# Patient Record
Sex: MALE | Race: WHITE | NOT HISPANIC OR LATINO
[De-identification: names, ages, dates, MRNs, and addresses within clinical notes are randomized per-mention and may not be internally consistent; named-entity substitution may affect disease eponyms.]

---

## 2018-04-26 ENCOUNTER — RESULT REVIEW (OUTPATIENT)
Age: 83
End: 2018-04-26

## 2018-07-10 PROBLEM — Z00.00 ENCOUNTER FOR PREVENTIVE HEALTH EXAMINATION: Status: ACTIVE | Noted: 2018-07-10

## 2018-07-26 ENCOUNTER — APPOINTMENT (OUTPATIENT)
Dept: HEMATOLOGY ONCOLOGY | Facility: CLINIC | Age: 83
End: 2018-07-26
Payer: MEDICARE

## 2018-07-26 VITALS
HEART RATE: 69 BPM | BODY MASS INDEX: 17.02 KG/M2 | SYSTOLIC BLOOD PRESSURE: 117 MMHG | WEIGHT: 111 LBS | TEMPERATURE: 99.3 F | DIASTOLIC BLOOD PRESSURE: 65 MMHG | HEIGHT: 67.52 IN | RESPIRATION RATE: 16 BRPM | OXYGEN SATURATION: 97 %

## 2018-07-26 DIAGNOSIS — Z86.39 PERSONAL HISTORY OF OTHER ENDOCRINE, NUTRITIONAL AND METABOLIC DISEASE: ICD-10-CM

## 2018-07-26 DIAGNOSIS — Z80.0 FAMILY HISTORY OF MALIGNANT NEOPLASM OF DIGESTIVE ORGANS: ICD-10-CM

## 2018-07-26 DIAGNOSIS — Z85.828 PERSONAL HISTORY OF OTHER MALIGNANT NEOPLASM OF SKIN: ICD-10-CM

## 2018-07-26 DIAGNOSIS — Z82.49 FAMILY HISTORY OF ISCHEMIC HEART DISEASE AND OTHER DISEASES OF THE CIRCULATORY SYSTEM: ICD-10-CM

## 2018-07-26 PROCEDURE — 99214 OFFICE O/P EST MOD 30 MIN: CPT

## 2018-08-02 ENCOUNTER — APPOINTMENT (OUTPATIENT)
Dept: CARDIOLOGY | Facility: CLINIC | Age: 83
End: 2018-08-02

## 2018-08-02 VITALS
OXYGEN SATURATION: 99 % | WEIGHT: 112 LBS | TEMPERATURE: 97.9 F | HEART RATE: 132 BPM | SYSTOLIC BLOOD PRESSURE: 96 MMHG | HEIGHT: 68 IN | DIASTOLIC BLOOD PRESSURE: 64 MMHG | BODY MASS INDEX: 16.97 KG/M2

## 2018-08-02 DIAGNOSIS — M19.90 UNSPECIFIED OSTEOARTHRITIS, UNSPECIFIED SITE: ICD-10-CM

## 2018-08-02 DIAGNOSIS — Z78.9 OTHER SPECIFIED HEALTH STATUS: ICD-10-CM

## 2018-08-02 DIAGNOSIS — N40.0 BENIGN PROSTATIC HYPERPLASIA WITHOUT LOWER URINARY TRACT SYMPMS: ICD-10-CM

## 2018-08-02 DIAGNOSIS — Z87.898 PERSONAL HISTORY OF OTHER SPECIFIED CONDITIONS: ICD-10-CM

## 2018-08-02 DIAGNOSIS — Z87.891 PERSONAL HISTORY OF NICOTINE DEPENDENCE: ICD-10-CM

## 2018-08-03 ENCOUNTER — NON-APPOINTMENT (OUTPATIENT)
Age: 83
End: 2018-08-03

## 2018-08-03 PROBLEM — M19.90 OSTEOARTHRITIS: Status: RESOLVED | Noted: 2018-08-03 | Resolved: 2018-08-03

## 2018-08-03 PROBLEM — Z87.891 FORMER SMOKER: Status: ACTIVE | Noted: 2018-07-26

## 2018-08-03 PROBLEM — Z78.9 SOCIAL ALCOHOL USE: Status: ACTIVE | Noted: 2018-08-03

## 2018-08-03 PROBLEM — Z87.898 HISTORY OF PERIPHERAL EDEMA: Status: RESOLVED | Noted: 2018-08-03 | Resolved: 2018-08-03

## 2018-08-03 PROBLEM — N40.0 BPH WITHOUT URINARY OBSTRUCTION: Status: RESOLVED | Noted: 2018-08-03 | Resolved: 2018-08-03

## 2018-08-06 ENCOUNTER — APPOINTMENT (OUTPATIENT)
Dept: CARDIOLOGY | Facility: CLINIC | Age: 83
End: 2018-08-06

## 2018-08-06 VITALS
DIASTOLIC BLOOD PRESSURE: 58 MMHG | SYSTOLIC BLOOD PRESSURE: 94 MMHG | BODY MASS INDEX: 17.03 KG/M2 | TEMPERATURE: 97.9 F | OXYGEN SATURATION: 100 % | WEIGHT: 112 LBS | HEART RATE: 100 BPM

## 2018-08-08 ENCOUNTER — RESULT REVIEW (OUTPATIENT)
Age: 83
End: 2018-08-08

## 2018-08-09 ENCOUNTER — APPOINTMENT (OUTPATIENT)
Dept: HEMATOLOGY ONCOLOGY | Facility: CLINIC | Age: 83
End: 2018-08-09
Payer: MEDICARE

## 2018-08-09 VITALS
TEMPERATURE: 98.2 F | RESPIRATION RATE: 14 BRPM | HEIGHT: 67.99 IN | DIASTOLIC BLOOD PRESSURE: 59 MMHG | OXYGEN SATURATION: 98 % | SYSTOLIC BLOOD PRESSURE: 90 MMHG | HEART RATE: 98 BPM | BODY MASS INDEX: 17.43 KG/M2 | WEIGHT: 114.99 LBS

## 2018-08-09 PROCEDURE — 38222 DX BONE MARROW BX & ASPIR: CPT | Mod: LT

## 2018-08-09 PROCEDURE — 99214 OFFICE O/P EST MOD 30 MIN: CPT | Mod: 25

## 2018-08-13 ENCOUNTER — NON-APPOINTMENT (OUTPATIENT)
Age: 83
End: 2018-08-13

## 2018-08-14 ENCOUNTER — RX RENEWAL (OUTPATIENT)
Age: 83
End: 2018-08-14

## 2018-08-20 ENCOUNTER — APPOINTMENT (OUTPATIENT)
Dept: HEMATOLOGY ONCOLOGY | Facility: CLINIC | Age: 83
End: 2018-08-20
Payer: MEDICARE

## 2018-08-20 VITALS
RESPIRATION RATE: 16 BRPM | DIASTOLIC BLOOD PRESSURE: 53 MMHG | SYSTOLIC BLOOD PRESSURE: 108 MMHG | BODY MASS INDEX: 17.12 KG/M2 | TEMPERATURE: 97.8 F | HEIGHT: 67.99 IN | HEART RATE: 80 BPM | WEIGHT: 112.99 LBS | OXYGEN SATURATION: 85 %

## 2018-08-20 PROCEDURE — 99214 OFFICE O/P EST MOD 30 MIN: CPT

## 2018-08-20 RX ORDER — METHYLPREDNISOLONE 4 MG/1
4 TABLET ORAL
Qty: 21 | Refills: 0 | Status: COMPLETED | COMMUNITY
Start: 2017-09-15

## 2018-08-20 RX ORDER — CELECOXIB 200 MG/1
200 CAPSULE ORAL
Qty: 90 | Refills: 0 | Status: COMPLETED | COMMUNITY
Start: 2018-01-05

## 2018-08-27 ENCOUNTER — TRANSCRIPTION ENCOUNTER (OUTPATIENT)
Age: 83
End: 2018-08-27

## 2018-08-27 ENCOUNTER — APPOINTMENT (OUTPATIENT)
Dept: CARDIOLOGY | Facility: CLINIC | Age: 83
End: 2018-08-27

## 2018-08-27 VITALS
SYSTOLIC BLOOD PRESSURE: 108 MMHG | OXYGEN SATURATION: 100 % | HEART RATE: 50 BPM | WEIGHT: 106 LBS | DIASTOLIC BLOOD PRESSURE: 38 MMHG | TEMPERATURE: 97.6 F | BODY MASS INDEX: 16.12 KG/M2

## 2018-08-27 DIAGNOSIS — Z86.2 PERSONAL HISTORY OF DISEASES OF THE BLOOD AND BLOOD-FORMING ORGANS AND CERTAIN DISORDERS INVOLVING THE IMMUNE MECHANISM: ICD-10-CM

## 2018-09-02 ENCOUNTER — NON-APPOINTMENT (OUTPATIENT)
Age: 83
End: 2018-09-02

## 2018-09-02 PROBLEM — Z86.2 HISTORY OF ANEMIA: Status: RESOLVED | Noted: 2018-08-03 | Resolved: 2018-09-02

## 2018-09-05 ENCOUNTER — APPOINTMENT (OUTPATIENT)
Dept: CARDIOTHORACIC SURGERY | Facility: CLINIC | Age: 83
End: 2018-09-05
Payer: MEDICARE

## 2018-09-12 ENCOUNTER — APPOINTMENT (OUTPATIENT)
Dept: CARDIOTHORACIC SURGERY | Facility: CLINIC | Age: 83
End: 2018-09-12
Payer: MEDICARE

## 2018-09-12 PROCEDURE — 99204 OFFICE O/P NEW MOD 45 MIN: CPT

## 2018-09-18 VITALS — HEART RATE: 50 BPM | SYSTOLIC BLOOD PRESSURE: 128 MMHG | DIASTOLIC BLOOD PRESSURE: 58 MMHG

## 2018-09-18 RX ORDER — CHROMIUM 200 MCG
1000 TABLET ORAL DAILY
Refills: 0 | Status: ACTIVE | COMMUNITY

## 2018-09-18 RX ORDER — GLUCOSAMINE/CHONDR SU A SOD 750-600 MG
TABLET ORAL
Refills: 0 | Status: ACTIVE | COMMUNITY

## 2018-09-18 RX ORDER — SAW PALMETTO 160 MG
500 CAPSULE ORAL DAILY
Refills: 0 | Status: ACTIVE | COMMUNITY

## 2018-09-18 RX ORDER — PSYLLIUM HUSK 0.4 G
CAPSULE ORAL DAILY
Refills: 0 | Status: ACTIVE | COMMUNITY

## 2018-09-18 RX ORDER — B-COMPLEX WITH VITAMIN C
TABLET ORAL DAILY
Refills: 0 | Status: ACTIVE | COMMUNITY

## 2018-10-01 ENCOUNTER — FORM ENCOUNTER (OUTPATIENT)
Age: 83
End: 2018-10-01

## 2018-10-01 ENCOUNTER — APPOINTMENT (OUTPATIENT)
Dept: HEMATOLOGY ONCOLOGY | Facility: CLINIC | Age: 83
End: 2018-10-01

## 2018-10-01 RX ORDER — CHLORHEXIDINE GLUCONATE 213 G/1000ML
1 SOLUTION TOPICAL ONCE
Qty: 0 | Refills: 0 | Status: DISCONTINUED | OUTPATIENT
Start: 2018-10-02 | End: 2018-10-02

## 2018-10-01 NOTE — PROGRESS NOTE ADULT - SUBJECTIVE AND OBJECTIVE BOX
[facility] Interventional Cardiology Addendum Note to Structural Heart AMBI H&P:     Attending MD: Dr Nir Landaverde        S: Pt presents for Right/Right and Left/Left Heart Catheterization (see office H&P for detailed History).  Pt reports that today he/she feels ...............        Allergies    No Known Allergies    Intolerances        Current Medications:       PHYSICAL EXAM    V/S		BP:		        HR:	           RR: 		  TEMP:     General:   HEENT: NCAT, EOMI, PERRLA  NECK: No JVD, No carotid bruits B/L, +2 Carotid pulses B/L  PULM:  CTA B/L No W/R/R  CARD: RRR/Irreg, +S1 +S2,  M/R/G  ABD: ND, +BS, NT, no masses  EXT: Warm, No pedal edema  NEURO: A & O x 3, no focal neurologic deficits  PULSES:	     B	          R	      	  FEM          		           DP        PT       Right              			  No/Yes	        Bruit	          Left       	         		  No/Yes	        Bruit	   		                                                              LABS:                        EKG:    ASA _____				Mallampati class: _________	    A/P:          Sedation Plan:   ? None   ? Moderate    ?  Deep    ?  General Anesthesia   Patient Is Suitable Candidate For Sedation?     ? Yes   ? No   ? Not Applicable     Risks & benefits of procedure and sedation and risks and benefits for the alternative therapy have been explained to the patient in layman’s terms including but not limited to: allergic reaction, bleeding, infection, arrhythmia, respiratory compromise, renal and vascular compromise, limb damage, MI, CVA, emergent CABG/Vascular Surgery and death. Informed consent obtained and in chart. [facility] Interventional Cardiology Addendum Note to Structural Heart AMBI H&P:     Attending MD: Dr Nir Landaverde        S: Pt presents for Left Heart Catheterization (see office H&P for detailed History).  Pt reports that today he feels well, denies current SOB, palpitations, chest pain, or dizziness.        Allergies: No Known Allergies        Current Medications:   ASA 81mg PO daily  Atenolol 25mg PO BID  Glucosamine-Chondroitin 1 tab PO daily  Lasix 40mg PO daily  Lipitor 10mg PO daily  Vitamin B complex 1 tab PO daily  Vitamin C 500mg PO daily  Vitamin D 1000units PO daily            PHYSICAL EXAM    V/S		BP:		        HR:	           RR: 		  TEMP:     General: elderly male in NAD  HEENT: NC/AT, moist mucous membranes  NECK: Supple, no JVD  PULM:  CTA B/L   CARD: RRR, +S1 +S2,  +MARIANN IV/VI RUSB  ABD: +BS, soft, NT/ND  EXT: Warm well perfused, no pedal edema  NEURO: no focal neurologic deficits  PULSES:	     B	          R	      	  FEM          		           DP        PT       Right     2+         2+		              2+    	       No Bruit	      1+           1+       Left       2+	      2+   		  2+             No Bruit	      1+	         1+	                                                              LABS:                        EKG:    ASA III			Mallampati class: III [facility] Interventional Cardiology Addendum Note to Structural Heart AMBI H&P:     Attending MD: Dr Nir Landaverde      S: Pt presents for Left Heart Catheterization (see office H&P for detailed History).  Pt reports that today he feels well, denies current SOB, palpitations, chest pain, or dizziness.    Allergies: No Known Allergies    Current Medications:   ASA 81mg PO daily  Atenolol 12.5mg PO q AM  Atenolol 25mg PO qhs  Lasix 40mg PO daily  Lipitor 10mg PO daily  Vitamin B complex 1 tab PO daily  Vitamin C 500mg PO daily  Vitamin D 1000units PO daily  Glucosamine 1 tab PO daily      PHYSICAL EXAM  V/S		BP:		        HR:	           RR: 		  TEMP:     General: elderly male in NAD  HEENT: NC/AT, moist mucous membranes  NECK: Supple, no JVD  PULM:  CTA B/L   CARD: RRR, +S1 +S2,  +MARIANN IV/VI RUSB  ABD: +BS, soft, NT/ND  EXT: Warm well perfused, trace pedal edema  NEURO: no focal neurologic deficits  PULSES:	     B	          R	      	  FEM          		           DP        PT       Right     2+         2+		              2+    	       No Bruit	      1+           1+       Left       2+	       2+   		  2+             No Bruit	      1+	         1+	                                                              LABS:          EKG: Sinus bradycardia@42BPM with no acute ST or T wave changes.    ASA III			Mallampati class: III

## 2018-10-01 NOTE — PROGRESS NOTE ADULT - ASSESSMENT
84 yr old M with PMHx of HTN, hyperlipidemia, Waldenstrom's macroglobulinemia/monoclonal gammopathy, pAfib (not on anticoagulation), chronic diastolic CHF, severe AS, recent admission to OSH with decompensated heart failure, Echo 7/19/18 revealed EF:73%, calcified AV with severe AS (mean gradient 58.3mHg, peak gradient 84.5mmHg and ANGE 0.71cm2), who now presents for recommended diagnostic left heart cath for TAVR evaluation.            Sedation Plan:  Moderate  Patient Is Suitable Candidate For Sedation: Yes    Risks & benefits of procedure and sedation and risks and benefits for the alternative therapy have been explained to the patient in layman’s terms including but not limited to: allergic reaction, bleeding, infection, arrhythmia, respiratory compromise, renal and vascular compromise, limb damage, MI, CVA, emergent CABG/Vascular Surgery and death. Informed consent obtained and in chart. 84 yr old M with PMHx of HTN, hyperlipidemia, ?Waldenstrom's macroglobulinemia/monoclonal gammopathy, pAfib (not on anticoagulation), chronic diastolic CHF, severe AS, recent admission to OSH with decompensated heart failure, Echo 7/19/18 revealed EF:73%, calcified AV with severe AS (mean gradient 58.3mHg, peak gradient 84.5mmHg and ANGE 0.71cm2), who now presents for recommended diagnostic left heart cath for TAVR evaluation.    --NPO, consented  --awaiting labs        Sedation Plan:  Moderate  Patient Is Suitable Candidate For Sedation: Yes    Risks & benefits of procedure and sedation and risks and benefits for the alternative therapy have been explained to the patient in layman’s terms including but not limited to: allergic reaction, bleeding, infection, arrhythmia, respiratory compromise, renal and vascular compromise, limb damage, MI, CVA, emergent CABG/Vascular Surgery and death. Informed consent obtained and in chart.

## 2018-10-02 ENCOUNTER — APPOINTMENT (OUTPATIENT)
Dept: CARDIOLOGY | Facility: CLINIC | Age: 83
End: 2018-10-02

## 2018-10-02 ENCOUNTER — OUTPATIENT (OUTPATIENT)
Dept: OUTPATIENT SERVICES | Facility: HOSPITAL | Age: 83
LOS: 1 days | Discharge: HOME | End: 2018-10-02
Payer: MEDICARE

## 2018-10-02 ENCOUNTER — APPOINTMENT (OUTPATIENT)
Dept: CARDIOTHORACIC SURGERY | Facility: HOSPITAL | Age: 83
End: 2018-10-02
Payer: MEDICARE

## 2018-10-02 VITALS — HEIGHT: 68 IN | WEIGHT: 110.01 LBS

## 2018-10-02 LAB
ALBUMIN SERPL ELPH-MCNC: 3.5 G/DL — SIGNIFICANT CHANGE UP (ref 3.3–5)
ALP SERPL-CCNC: 79 U/L — SIGNIFICANT CHANGE UP (ref 40–120)
ALT FLD-CCNC: 17 U/L — SIGNIFICANT CHANGE UP (ref 10–45)
ANION GAP SERPL CALC-SCNC: 9 MMOL/L — SIGNIFICANT CHANGE UP (ref 5–17)
APTT BLD: 33.5 SEC — SIGNIFICANT CHANGE UP (ref 27.5–37.4)
AST SERPL-CCNC: 28 U/L — SIGNIFICANT CHANGE UP (ref 10–40)
BASOPHILS NFR BLD AUTO: 0.3 % — SIGNIFICANT CHANGE UP (ref 0–2)
BILIRUB SERPL-MCNC: 0.7 MG/DL — SIGNIFICANT CHANGE UP (ref 0.2–1.2)
BUN SERPL-MCNC: 15 MG/DL — SIGNIFICANT CHANGE UP (ref 7–23)
CALCIUM SERPL-MCNC: 9.5 MG/DL — SIGNIFICANT CHANGE UP (ref 8.4–10.5)
CHLORIDE SERPL-SCNC: 99 MMOL/L — SIGNIFICANT CHANGE UP (ref 96–108)
CHOLEST SERPL-MCNC: 158 MG/DL — SIGNIFICANT CHANGE UP (ref 10–199)
CK MB CFR SERPL CALC: 1.5 NG/ML — SIGNIFICANT CHANGE UP (ref 0–6.7)
CK SERPL-CCNC: 32 U/L — SIGNIFICANT CHANGE UP (ref 30–200)
CO2 SERPL-SCNC: 30 MMOL/L — SIGNIFICANT CHANGE UP (ref 22–31)
CREAT SERPL-MCNC: 0.76 MG/DL — SIGNIFICANT CHANGE UP (ref 0.5–1.3)
CRP SERPL-MCNC: 0.65 MG/DL — HIGH (ref 0–0.4)
EOSINOPHIL NFR BLD AUTO: 4.3 % — SIGNIFICANT CHANGE UP (ref 0–6)
GLUCOSE SERPL-MCNC: 100 MG/DL — HIGH (ref 70–99)
HBA1C BLD-MCNC: 5.4 % — SIGNIFICANT CHANGE UP (ref 4–5.6)
HCT VFR BLD CALC: 37.9 % — LOW (ref 39–50)
HDLC SERPL-MCNC: 40 MG/DL — SIGNIFICANT CHANGE UP
HGB BLD-MCNC: 12.2 G/DL — LOW (ref 13–17)
INR BLD: 1.05 — SIGNIFICANT CHANGE UP (ref 0.88–1.16)
LIPID PNL WITH DIRECT LDL SERPL: 102 MG/DL — SIGNIFICANT CHANGE UP
LYMPHOCYTES # BLD AUTO: 27 % — SIGNIFICANT CHANGE UP (ref 13–44)
MCHC RBC-ENTMCNC: 30.3 PG — SIGNIFICANT CHANGE UP (ref 27–34)
MCHC RBC-ENTMCNC: 32.2 G/DL — SIGNIFICANT CHANGE UP (ref 32–36)
MCV RBC AUTO: 94.3 FL — SIGNIFICANT CHANGE UP (ref 80–100)
MONOCYTES NFR BLD AUTO: 9.7 % — SIGNIFICANT CHANGE UP (ref 2–14)
NEUTROPHILS NFR BLD AUTO: 58.7 % — SIGNIFICANT CHANGE UP (ref 43–77)
PLATELET # BLD AUTO: 231 K/UL — SIGNIFICANT CHANGE UP (ref 150–400)
POTASSIUM SERPL-MCNC: 4.3 MMOL/L — SIGNIFICANT CHANGE UP (ref 3.5–5.3)
POTASSIUM SERPL-SCNC: 4.3 MMOL/L — SIGNIFICANT CHANGE UP (ref 3.5–5.3)
PROT SERPL-MCNC: 7 G/DL — SIGNIFICANT CHANGE UP (ref 6–8.3)
PROTHROM AB SERPL-ACNC: 11.7 SEC — SIGNIFICANT CHANGE UP (ref 9.8–12.7)
RBC # BLD: 4.02 M/UL — LOW (ref 4.2–5.8)
RBC # FLD: 13.2 % — SIGNIFICANT CHANGE UP (ref 10.3–16.9)
SODIUM SERPL-SCNC: 138 MMOL/L — SIGNIFICANT CHANGE UP (ref 135–145)
TOTAL CHOLESTEROL/HDL RATIO MEASUREMENT: 4 RATIO — SIGNIFICANT CHANGE UP (ref 3.4–9.6)
TRIGL SERPL-MCNC: 79 MG/DL — SIGNIFICANT CHANGE UP (ref 10–149)
WBC # BLD: 7.6 K/UL — SIGNIFICANT CHANGE UP (ref 3.8–10.5)
WBC # FLD AUTO: 7.6 K/UL — SIGNIFICANT CHANGE UP (ref 3.8–10.5)

## 2018-10-02 PROCEDURE — 93880 EXTRACRANIAL BILAT STUDY: CPT | Mod: 26

## 2018-10-02 PROCEDURE — 75573 CT HRT C+ STRUX CGEN HRT DS: CPT | Mod: 26

## 2018-10-02 PROCEDURE — 86140 C-REACTIVE PROTEIN: CPT

## 2018-10-02 PROCEDURE — 80053 COMPREHEN METABOLIC PANEL: CPT

## 2018-10-02 PROCEDURE — 94150 VITAL CAPACITY TEST: CPT

## 2018-10-02 PROCEDURE — 93452 LEFT HRT CATH W/VENTRCLGRPHY: CPT

## 2018-10-02 PROCEDURE — 82550 ASSAY OF CK (CPK): CPT

## 2018-10-02 PROCEDURE — 82553 CREATINE MB FRACTION: CPT

## 2018-10-02 PROCEDURE — 85730 THROMBOPLASTIN TIME PARTIAL: CPT

## 2018-10-02 PROCEDURE — 74174 CTA ABD&PLVS W/CONTRAST: CPT

## 2018-10-02 PROCEDURE — 74174 CTA ABD&PLVS W/CONTRAST: CPT | Mod: 26

## 2018-10-02 PROCEDURE — 83036 HEMOGLOBIN GLYCOSYLATED A1C: CPT

## 2018-10-02 PROCEDURE — C1887: CPT

## 2018-10-02 PROCEDURE — 93454 CORONARY ARTERY ANGIO S&I: CPT | Mod: 26

## 2018-10-02 PROCEDURE — 93880 EXTRACRANIAL BILAT STUDY: CPT

## 2018-10-02 PROCEDURE — 70450 CT HEAD/BRAIN W/O DYE: CPT

## 2018-10-02 PROCEDURE — 70450 CT HEAD/BRAIN W/O DYE: CPT | Mod: 26

## 2018-10-02 PROCEDURE — 80061 LIPID PANEL: CPT

## 2018-10-02 PROCEDURE — C1769: CPT

## 2018-10-02 PROCEDURE — 85610 PROTHROMBIN TIME: CPT

## 2018-10-02 PROCEDURE — 85025 COMPLETE CBC W/AUTO DIFF WBC: CPT

## 2018-10-02 PROCEDURE — 75573 CT HRT C+ STRUX CGEN HRT DS: CPT

## 2018-10-02 NOTE — PROGRESS NOTE ADULT - SUBJECTIVE AND OBJECTIVE BOX
Interventional Cardiology PA SDA Discharge Note    Patient without complaints. Ambulated and voided without difficulties    Afebrile, VSS    Ext:    		  		Right   Radial :  NO  hematoma,   NO  bleeding, dressing; C/D/I      Pulses:    intact RAD to baseline     A/P:    84 yr old M with PMHx of HTN, hyperlipidemia, ?Waldenstrom's macroglobulinemia/monoclonal gammopathy, pAfib (not on anticoagulation), chronic diastolic CHF, severe AS, recent admission to OSH with decompensated heart failure, Echo 7/19/18 revealed EF:73%, calcified AV with severe AS (mean gradient 58.3mHg, peak gradient 84.5mmHg and ANGE 0.71cm2), who now presents for recommended diagnostic left heart cath for TAVR evaluation.  Patient is s/p diagnostic cardiac cath revealing non obstructed CAD and severe AS.  Patient underwent pre op TAVR Work up.  He is deemed stable for discharge home as per Dr. Landaverde.      1.	Stable for discharge as per attending  ____Akhil_____ after bed rest, pt voids, groin/wrist stable and 30 minutes of ambulation.  2.	Follow-up with PMD/Cardiologist ___Akhil________ in 1-2 weeks  3.	Discharged forms signed and copies in chart

## 2018-10-05 DIAGNOSIS — D64.9 ANEMIA, UNSPECIFIED: ICD-10-CM

## 2018-10-05 DIAGNOSIS — I48.91 UNSPECIFIED ATRIAL FIBRILLATION: ICD-10-CM

## 2018-10-05 DIAGNOSIS — I50.32 CHRONIC DIASTOLIC (CONGESTIVE) HEART FAILURE: ICD-10-CM

## 2018-10-05 DIAGNOSIS — E78.5 HYPERLIPIDEMIA, UNSPECIFIED: ICD-10-CM

## 2018-10-05 DIAGNOSIS — I10 ESSENTIAL (PRIMARY) HYPERTENSION: ICD-10-CM

## 2018-10-05 DIAGNOSIS — I35.0 NONRHEUMATIC AORTIC (VALVE) STENOSIS: ICD-10-CM

## 2018-10-05 DIAGNOSIS — C88.0 WALDENSTROM MACROGLOBULINEMIA: ICD-10-CM

## 2018-10-08 VITALS
TEMPERATURE: 98 F | RESPIRATION RATE: 14 BRPM | WEIGHT: 110.01 LBS | OXYGEN SATURATION: 100 % | SYSTOLIC BLOOD PRESSURE: 110 MMHG | HEIGHT: 71 IN | DIASTOLIC BLOOD PRESSURE: 54 MMHG

## 2018-10-09 ENCOUNTER — APPOINTMENT (OUTPATIENT)
Dept: CARDIOTHORACIC SURGERY | Facility: HOSPITAL | Age: 83
End: 2018-10-09
Payer: MEDICARE

## 2018-10-09 ENCOUNTER — INPATIENT (INPATIENT)
Facility: HOSPITAL | Age: 83
LOS: 1 days | Discharge: HOME HEALTH | DRG: 267 | End: 2018-10-11
Attending: INTERNAL MEDICINE | Admitting: INTERNAL MEDICINE
Payer: MEDICARE

## 2018-10-09 DIAGNOSIS — H26.9 UNSPECIFIED CATARACT: Chronic | ICD-10-CM

## 2018-10-09 DIAGNOSIS — I97.190 OTHER POSTPROCEDURAL CARDIAC FUNCTIONAL DISTURBANCES FOLLOWING CARDIAC SURGERY: ICD-10-CM

## 2018-10-09 DIAGNOSIS — Z00.6 ENCOUNTER FOR EXAMINATION FOR NORMAL COMPARISON AND CONTROL IN CLINICAL RESEARCH PROGRAM: ICD-10-CM

## 2018-10-09 DIAGNOSIS — I48.92 UNSPECIFIED ATRIAL FLUTTER: ICD-10-CM

## 2018-10-09 DIAGNOSIS — Z98.890 OTHER SPECIFIED POSTPROCEDURAL STATES: Chronic | ICD-10-CM

## 2018-10-09 DIAGNOSIS — C88.0 WALDENSTROM MACROGLOBULINEMIA: ICD-10-CM

## 2018-10-09 DIAGNOSIS — D47.2 MONOCLONAL GAMMOPATHY: ICD-10-CM

## 2018-10-09 LAB
ALBUMIN SERPL ELPH-MCNC: 2.8 G/DL — LOW (ref 3.3–5)
ALBUMIN SERPL ELPH-MCNC: 3.3 G/DL — SIGNIFICANT CHANGE UP (ref 3.3–5)
ALBUMIN SERPL ELPH-MCNC: 3.6 G/DL — SIGNIFICANT CHANGE UP (ref 3.3–5)
ALP SERPL-CCNC: 59 U/L — SIGNIFICANT CHANGE UP (ref 40–120)
ALP SERPL-CCNC: 64 U/L — SIGNIFICANT CHANGE UP (ref 40–120)
ALP SERPL-CCNC: 71 U/L — SIGNIFICANT CHANGE UP (ref 40–120)
ALT FLD-CCNC: 10 U/L — SIGNIFICANT CHANGE UP (ref 10–45)
ALT FLD-CCNC: 11 U/L — SIGNIFICANT CHANGE UP (ref 10–45)
ALT FLD-CCNC: 9 U/L — LOW (ref 10–45)
ANION GAP SERPL CALC-SCNC: 8 MMOL/L — SIGNIFICANT CHANGE UP (ref 5–17)
ANION GAP SERPL CALC-SCNC: 8 MMOL/L — SIGNIFICANT CHANGE UP (ref 5–17)
ANION GAP SERPL CALC-SCNC: 9 MMOL/L — SIGNIFICANT CHANGE UP (ref 5–17)
APTT BLD: 31.9 SEC — SIGNIFICANT CHANGE UP (ref 27.5–37.4)
APTT BLD: 34.9 SEC — SIGNIFICANT CHANGE UP (ref 27.5–37.4)
APTT BLD: 38.1 SEC — HIGH (ref 27.5–37.4)
AST SERPL-CCNC: 20 U/L — SIGNIFICANT CHANGE UP (ref 10–40)
BASOPHILS NFR BLD AUTO: 0.2 % — SIGNIFICANT CHANGE UP (ref 0–2)
BILIRUB SERPL-MCNC: 0.4 MG/DL — SIGNIFICANT CHANGE UP (ref 0.2–1.2)
BILIRUB SERPL-MCNC: 0.5 MG/DL — SIGNIFICANT CHANGE UP (ref 0.2–1.2)
BILIRUB SERPL-MCNC: 0.6 MG/DL — SIGNIFICANT CHANGE UP (ref 0.2–1.2)
BLD GP AB SCN SERPL QL: NEGATIVE — SIGNIFICANT CHANGE UP
BUN SERPL-MCNC: 18 MG/DL — SIGNIFICANT CHANGE UP (ref 7–23)
BUN SERPL-MCNC: 20 MG/DL — SIGNIFICANT CHANGE UP (ref 7–23)
BUN SERPL-MCNC: 21 MG/DL — SIGNIFICANT CHANGE UP (ref 7–23)
CALCIUM SERPL-MCNC: 8.8 MG/DL — SIGNIFICANT CHANGE UP (ref 8.4–10.5)
CALCIUM SERPL-MCNC: 9 MG/DL — SIGNIFICANT CHANGE UP (ref 8.4–10.5)
CALCIUM SERPL-MCNC: 9.6 MG/DL — SIGNIFICANT CHANGE UP (ref 8.4–10.5)
CHLORIDE SERPL-SCNC: 104 MMOL/L — SIGNIFICANT CHANGE UP (ref 96–108)
CHLORIDE SERPL-SCNC: 104 MMOL/L — SIGNIFICANT CHANGE UP (ref 96–108)
CHLORIDE SERPL-SCNC: 98 MMOL/L — SIGNIFICANT CHANGE UP (ref 96–108)
CO2 SERPL-SCNC: 28 MMOL/L — SIGNIFICANT CHANGE UP (ref 22–31)
CO2 SERPL-SCNC: 29 MMOL/L — SIGNIFICANT CHANGE UP (ref 22–31)
CO2 SERPL-SCNC: 31 MMOL/L — SIGNIFICANT CHANGE UP (ref 22–31)
CREAT SERPL-MCNC: 0.67 MG/DL — SIGNIFICANT CHANGE UP (ref 0.5–1.3)
CREAT SERPL-MCNC: 0.88 MG/DL — SIGNIFICANT CHANGE UP (ref 0.5–1.3)
CREAT SERPL-MCNC: 0.9 MG/DL — SIGNIFICANT CHANGE UP (ref 0.5–1.3)
EOSINOPHIL NFR BLD AUTO: 5.4 % — SIGNIFICANT CHANGE UP (ref 0–6)
GAS PNL BLDA: SIGNIFICANT CHANGE UP
GAS PNL BLDA: SIGNIFICANT CHANGE UP
GLUCOSE BLDC GLUCOMTR-MCNC: 117 MG/DL — HIGH (ref 70–99)
GLUCOSE SERPL-MCNC: 136 MG/DL — HIGH (ref 70–99)
GLUCOSE SERPL-MCNC: 90 MG/DL — SIGNIFICANT CHANGE UP (ref 70–99)
GLUCOSE SERPL-MCNC: 91 MG/DL — SIGNIFICANT CHANGE UP (ref 70–99)
HCT VFR BLD CALC: 28.7 % — LOW (ref 39–50)
HCT VFR BLD CALC: 31.9 % — LOW (ref 39–50)
HCT VFR BLD CALC: 34.9 % — LOW (ref 39–50)
HGB BLD-MCNC: 10.6 G/DL — LOW (ref 13–17)
HGB BLD-MCNC: 11.3 G/DL — LOW (ref 13–17)
HGB BLD-MCNC: 9.5 G/DL — LOW (ref 13–17)
INR BLD: 1.08 — SIGNIFICANT CHANGE UP (ref 0.88–1.16)
INR BLD: 1.15 — SIGNIFICANT CHANGE UP (ref 0.88–1.16)
INR BLD: 1.17 — HIGH (ref 0.88–1.16)
LACTATE SERPL-SCNC: 0.9 MMOL/L — SIGNIFICANT CHANGE UP (ref 0.5–2)
LYMPHOCYTES # BLD AUTO: 26.8 % — SIGNIFICANT CHANGE UP (ref 13–44)
MAGNESIUM SERPL-MCNC: 2 MG/DL — SIGNIFICANT CHANGE UP (ref 1.6–2.6)
MAGNESIUM SERPL-MCNC: 2.1 MG/DL — SIGNIFICANT CHANGE UP (ref 1.6–2.6)
MAGNESIUM SERPL-MCNC: 2.2 MG/DL — SIGNIFICANT CHANGE UP (ref 1.6–2.6)
MCHC RBC-ENTMCNC: 30.2 PG — SIGNIFICANT CHANGE UP (ref 27–34)
MCHC RBC-ENTMCNC: 30.8 PG — SIGNIFICANT CHANGE UP (ref 27–34)
MCHC RBC-ENTMCNC: 30.8 PG — SIGNIFICANT CHANGE UP (ref 27–34)
MCHC RBC-ENTMCNC: 32.4 G/DL — SIGNIFICANT CHANGE UP (ref 32–36)
MCHC RBC-ENTMCNC: 33.1 G/DL — SIGNIFICANT CHANGE UP (ref 32–36)
MCHC RBC-ENTMCNC: 33.2 G/DL — SIGNIFICANT CHANGE UP (ref 32–36)
MCV RBC AUTO: 92.7 FL — SIGNIFICANT CHANGE UP (ref 80–100)
MCV RBC AUTO: 93.2 FL — SIGNIFICANT CHANGE UP (ref 80–100)
MCV RBC AUTO: 93.3 FL — SIGNIFICANT CHANGE UP (ref 80–100)
MONOCYTES NFR BLD AUTO: 7.7 % — SIGNIFICANT CHANGE UP (ref 2–14)
NEUTROPHILS NFR BLD AUTO: 59.9 % — SIGNIFICANT CHANGE UP (ref 43–77)
NT-PROBNP SERPL-SCNC: 1263 PG/ML — HIGH (ref 0–300)
PHOSPHATE SERPL-MCNC: 3.2 MG/DL — SIGNIFICANT CHANGE UP (ref 2.5–4.5)
PHOSPHATE SERPL-MCNC: 3.3 MG/DL — SIGNIFICANT CHANGE UP (ref 2.5–4.5)
PHOSPHATE SERPL-MCNC: 4 MG/DL — SIGNIFICANT CHANGE UP (ref 2.5–4.5)
PLATELET # BLD AUTO: 191 K/UL — SIGNIFICANT CHANGE UP (ref 150–400)
PLATELET # BLD AUTO: 198 K/UL — SIGNIFICANT CHANGE UP (ref 150–400)
PLATELET # BLD AUTO: 271 K/UL — SIGNIFICANT CHANGE UP (ref 150–400)
POTASSIUM SERPL-MCNC: 4 MMOL/L — SIGNIFICANT CHANGE UP (ref 3.5–5.3)
POTASSIUM SERPL-MCNC: 4.1 MMOL/L — SIGNIFICANT CHANGE UP (ref 3.5–5.3)
POTASSIUM SERPL-MCNC: 4.2 MMOL/L — SIGNIFICANT CHANGE UP (ref 3.5–5.3)
POTASSIUM SERPL-SCNC: 4 MMOL/L — SIGNIFICANT CHANGE UP (ref 3.5–5.3)
POTASSIUM SERPL-SCNC: 4.1 MMOL/L — SIGNIFICANT CHANGE UP (ref 3.5–5.3)
POTASSIUM SERPL-SCNC: 4.2 MMOL/L — SIGNIFICANT CHANGE UP (ref 3.5–5.3)
PROT SERPL-MCNC: 5.8 G/DL — LOW (ref 6–8.3)
PROT SERPL-MCNC: 6.3 G/DL — SIGNIFICANT CHANGE UP (ref 6–8.3)
PROT SERPL-MCNC: 6.8 G/DL — SIGNIFICANT CHANGE UP (ref 6–8.3)
PROTHROM AB SERPL-ACNC: 12 SEC — SIGNIFICANT CHANGE UP (ref 9.8–12.7)
PROTHROM AB SERPL-ACNC: 12.8 SEC — HIGH (ref 9.8–12.7)
PROTHROM AB SERPL-ACNC: 13 SEC — HIGH (ref 9.8–12.7)
RBC # BLD: 3.08 M/UL — LOW (ref 4.2–5.8)
RBC # BLD: 3.44 M/UL — LOW (ref 4.2–5.8)
RBC # BLD: 3.74 M/UL — LOW (ref 4.2–5.8)
RBC # FLD: 12.9 % — SIGNIFICANT CHANGE UP (ref 10.3–16.9)
RBC # FLD: 13 % — SIGNIFICANT CHANGE UP (ref 10.3–16.9)
RBC # FLD: 13.1 % — SIGNIFICANT CHANGE UP (ref 10.3–16.9)
RH IG SCN BLD-IMP: NEGATIVE — SIGNIFICANT CHANGE UP
SODIUM SERPL-SCNC: 137 MMOL/L — SIGNIFICANT CHANGE UP (ref 135–145)
SODIUM SERPL-SCNC: 140 MMOL/L — SIGNIFICANT CHANGE UP (ref 135–145)
SODIUM SERPL-SCNC: 142 MMOL/L — SIGNIFICANT CHANGE UP (ref 135–145)
WBC # BLD: 5 K/UL — SIGNIFICANT CHANGE UP (ref 3.8–10.5)
WBC # BLD: 6.9 K/UL — SIGNIFICANT CHANGE UP (ref 3.8–10.5)
WBC # BLD: 6.9 K/UL — SIGNIFICANT CHANGE UP (ref 3.8–10.5)
WBC # FLD AUTO: 5 K/UL — SIGNIFICANT CHANGE UP (ref 3.8–10.5)
WBC # FLD AUTO: 6.9 K/UL — SIGNIFICANT CHANGE UP (ref 3.8–10.5)
WBC # FLD AUTO: 6.9 K/UL — SIGNIFICANT CHANGE UP (ref 3.8–10.5)

## 2018-10-09 PROCEDURE — 33361 REPLACE AORTIC VALVE PERQ: CPT | Mod: 62,Q0

## 2018-10-09 PROCEDURE — 99291 CRITICAL CARE FIRST HOUR: CPT

## 2018-10-09 PROCEDURE — 93306 TTE W/DOPPLER COMPLETE: CPT | Mod: 26

## 2018-10-09 PROCEDURE — 71045 X-RAY EXAM CHEST 1 VIEW: CPT | Mod: 26

## 2018-10-09 PROCEDURE — 93010 ELECTROCARDIOGRAM REPORT: CPT

## 2018-10-09 RX ORDER — DEXTROSE 50 % IN WATER 50 %
25 SYRINGE (ML) INTRAVENOUS ONCE
Qty: 0 | Refills: 0 | Status: DISCONTINUED | OUTPATIENT
Start: 2018-10-09 | End: 2018-10-11

## 2018-10-09 RX ORDER — CLOPIDOGREL BISULFATE 75 MG/1
300 TABLET, FILM COATED ORAL ONCE
Qty: 0 | Refills: 0 | Status: COMPLETED | OUTPATIENT
Start: 2018-10-09 | End: 2018-10-09

## 2018-10-09 RX ORDER — ASPIRIN/CALCIUM CARB/MAGNESIUM 324 MG
81 TABLET ORAL ONCE
Qty: 0 | Refills: 0 | Status: COMPLETED | OUTPATIENT
Start: 2018-10-09 | End: 2018-10-09

## 2018-10-09 RX ORDER — ATORVASTATIN CALCIUM 80 MG/1
10 TABLET, FILM COATED ORAL AT BEDTIME
Qty: 0 | Refills: 0 | Status: DISCONTINUED | OUTPATIENT
Start: 2018-10-10 | End: 2018-10-11

## 2018-10-09 RX ORDER — GLUCAGON INJECTION, SOLUTION 0.5 MG/.1ML
1 INJECTION, SOLUTION SUBCUTANEOUS ONCE
Qty: 0 | Refills: 0 | Status: DISCONTINUED | OUTPATIENT
Start: 2018-10-09 | End: 2018-10-11

## 2018-10-09 RX ORDER — ASPIRIN/CALCIUM CARB/MAGNESIUM 324 MG
81 TABLET ORAL DAILY
Qty: 0 | Refills: 0 | Status: DISCONTINUED | OUTPATIENT
Start: 2018-10-10 | End: 2018-10-11

## 2018-10-09 RX ORDER — INSULIN LISPRO 100/ML
VIAL (ML) SUBCUTANEOUS
Qty: 0 | Refills: 0 | Status: DISCONTINUED | OUTPATIENT
Start: 2018-10-09 | End: 2018-10-11

## 2018-10-09 RX ORDER — DEXTROSE 50 % IN WATER 50 %
12.5 SYRINGE (ML) INTRAVENOUS ONCE
Qty: 0 | Refills: 0 | Status: DISCONTINUED | OUTPATIENT
Start: 2018-10-09 | End: 2018-10-11

## 2018-10-09 RX ORDER — SODIUM CHLORIDE 9 MG/ML
1000 INJECTION, SOLUTION INTRAVENOUS
Qty: 0 | Refills: 0 | Status: DISCONTINUED | OUTPATIENT
Start: 2018-10-09 | End: 2018-10-11

## 2018-10-09 RX ORDER — GLUCOSAMINE/CHONDROITIN/C/MANG 500-400 MG
3 CAPSULE ORAL
Qty: 0 | Refills: 0 | COMMUNITY

## 2018-10-09 RX ORDER — PANTOPRAZOLE SODIUM 20 MG/1
40 TABLET, DELAYED RELEASE ORAL DAILY
Qty: 0 | Refills: 0 | Status: DISCONTINUED | OUTPATIENT
Start: 2018-10-09 | End: 2018-10-09

## 2018-10-09 RX ORDER — PANTOPRAZOLE SODIUM 20 MG/1
40 TABLET, DELAYED RELEASE ORAL
Qty: 0 | Refills: 0 | Status: DISCONTINUED | OUTPATIENT
Start: 2018-10-09 | End: 2018-10-11

## 2018-10-09 RX ORDER — SODIUM CHLORIDE 9 MG/ML
1000 INJECTION INTRAMUSCULAR; INTRAVENOUS; SUBCUTANEOUS
Qty: 0 | Refills: 0 | Status: DISCONTINUED | OUTPATIENT
Start: 2018-10-09 | End: 2018-10-10

## 2018-10-09 RX ORDER — HYDRALAZINE HCL 50 MG
10 TABLET ORAL EVERY 6 HOURS
Qty: 0 | Refills: 0 | Status: DISCONTINUED | OUTPATIENT
Start: 2018-10-09 | End: 2018-10-10

## 2018-10-09 RX ORDER — ASCORBIC ACID 60 MG
1 TABLET,CHEWABLE ORAL
Qty: 0 | Refills: 0 | COMMUNITY

## 2018-10-09 RX ORDER — CHOLECALCIFEROL (VITAMIN D3) 125 MCG
1 CAPSULE ORAL
Qty: 0 | Refills: 0 | COMMUNITY

## 2018-10-09 RX ORDER — DEXTROSE 50 % IN WATER 50 %
15 SYRINGE (ML) INTRAVENOUS ONCE
Qty: 0 | Refills: 0 | Status: DISCONTINUED | OUTPATIENT
Start: 2018-10-09 | End: 2018-10-11

## 2018-10-09 RX ORDER — CHLORHEXIDINE GLUCONATE 213 G/1000ML
1 SOLUTION TOPICAL DAILY
Qty: 0 | Refills: 0 | Status: DISCONTINUED | OUTPATIENT
Start: 2018-10-09 | End: 2018-10-11

## 2018-10-09 RX ORDER — HEPARIN SODIUM 5000 [USP'U]/ML
5000 INJECTION INTRAVENOUS; SUBCUTANEOUS EVERY 8 HOURS
Qty: 0 | Refills: 0 | Status: DISCONTINUED | OUTPATIENT
Start: 2018-10-09 | End: 2018-10-11

## 2018-10-09 RX ORDER — ACETAMINOPHEN 500 MG
1000 TABLET ORAL ONCE
Qty: 0 | Refills: 0 | Status: DISCONTINUED | OUTPATIENT
Start: 2018-10-09 | End: 2018-10-11

## 2018-10-09 RX ORDER — CEFAZOLIN SODIUM 1 G
2000 VIAL (EA) INJECTION EVERY 8 HOURS
Qty: 0 | Refills: 0 | Status: COMPLETED | OUTPATIENT
Start: 2018-10-09 | End: 2018-10-10

## 2018-10-09 RX ORDER — CLOPIDOGREL BISULFATE 75 MG/1
75 TABLET, FILM COATED ORAL DAILY
Qty: 0 | Refills: 0 | Status: DISCONTINUED | OUTPATIENT
Start: 2018-10-10 | End: 2018-10-11

## 2018-10-09 RX ADMIN — Medication 81 MILLIGRAM(S): at 22:02

## 2018-10-09 RX ADMIN — HEPARIN SODIUM 5000 UNIT(S): 5000 INJECTION INTRAVENOUS; SUBCUTANEOUS at 22:02

## 2018-10-09 RX ADMIN — Medication 100 MILLIGRAM(S): at 22:02

## 2018-10-09 RX ADMIN — CLOPIDOGREL BISULFATE 300 MILLIGRAM(S): 75 TABLET, FILM COATED ORAL at 22:33

## 2018-10-09 NOTE — BRIEF OPERATIVE NOTE - PROCEDURE
<<-----Click on this checkbox to enter Procedure TAVR (transcatheter aortic valve replacement)  10/09/2018    Active  NADIR

## 2018-10-09 NOTE — H&P ADULT - PMH
Aortic stenosis    Chronic diastolic (congestive) heart failure    Hyperlipidemia    Hypertension    Monoclonal gammopathy    Paroxysmal atrial fibrillation    Waldenstrom macroglobulinemia

## 2018-10-09 NOTE — H&P ADULT - HISTORY OF PRESENT ILLNESS
83 y/o male with hx HTN, HLD, Anemia d/t Waldenstrom's macroglobulinemia and monoclonal gammopathy (heme following), paroxysmal atrial fibrillation/flutter (not on AC), and chronic diastolic heart failure with severe aortic stenosis.  Patient recently admitted with decompensated heart failure and was referred to Dr. Landaverde for further evaluation of valvular disease.  Patient c/o increased fatigue x 1 year.  Patient recently went to [place] and would become light headed while walking on incline.  Denies SOB with exertion.  Patient noticed significant lower extremity edema.  Patient was referred to [facility] and found to be in AF with RVR.  He converted to NSR with Atenolol.  Patient had an echo during that admission showing severe aortic stenosis.  He was also found to be anemic with Hgb 8.6 and underwent hematology evaluation including bone marrow biopsy which diagnosed him with Waldenstrom's macroglobulinemia and monoclonal gammopathy.  Patient is monitored by Heme and when Hgb < 10 he receives Aranesp.

## 2018-10-09 NOTE — H&P ADULT - NSHPREVIEWOFSYSTEMS_GEN_ALL_CORE
Review of Systems  CONSTITUTIONAL:  Denies Fevers / chills, sweats, fatigue, weight loss, weight gain                                      NEURO:  Denies parathesias, seizures, syncope, confusion                                                                                EYES:  Denies Blurry vision, discharge, pain, loss of vision                                                                                    ENMT:  Denies Difficulty hearing, vertigo, dysphagia, epistaxis, recent dental work                                       CV:  Denies Chest pain, palpitations, MCCLELLAN, orthopnea                                                                                          RESPIRATORY:  +SOB while walking up incline, Denies Wheezing, cough / sputum, hemoptysis                                                                GI:  Denies Nausea, vomiting, diarrhea, constipation, melena, difficulty swallowing                                               : Denies Hematuria, dysuria, urgency, incontinence                                                                                         MUSKULOSKELETAL:  Denies arthritis, joint swelling, muscle weakness                                                             SKIN/BREAST:  Denies rash, itching, hair loss, masses                                                                                            PSYCH:  Denies depresion, anxiety, suicidal ideation                                                                                               HEME/LYMPH:  Denies bruises easily, enlarged lymph nodes, tender lymph nodes                                        ENDOCRINE:  Denies cold intolerance, heat intolerance, polydipsia

## 2018-10-09 NOTE — H&P ADULT - NSHPPHYSICALEXAM_GEN_ALL_CORE
Physical Exam  CONSTITUTIONAL:   resting in chair in NAD  NEURO:      A&O x 3 no focal defects                      EYES:       WNL  ENMT:      WNL  CV:   RRR + MARIANN right sternal border radiating to carotids   RESPIRATORY:  CTA bilaterally   GI:    soft, non-tender, non-distended   : JI + / -      WNL                                                            MUSCULOSKELETAL:               WNL  SKIN / BREAST:        WNL

## 2018-10-09 NOTE — H&P ADULT - ASSESSMENT
83 y/o male with hx HTN, HLD, Anemia d/t Waldenstrom's macroglobulinemia and monoclonal gammopathy (heme following), paroxysmal atrial fibrillation/flutter (not on AC), and chronic diastolic heart failure with severe aortic stenosis.  Patient recently admitted with decompensated heart failure and was referred to Dr. Landaverde for further evaluation of valvular disease.  Patient c/o increased fatigue x 1 year.  Patient was referred to [facility] and found to be in AF with RVR.  He converted to NSR with Atenolol.  Patient had an echo during that admission showing severe aortic stenosis.  EF 70%, mean AV gradient 58mmHg, peak AV gradient 85mmHg, ANGE 0.71.    A/P    1.  Patient denies any changes to current medical condition since last seen in office.  No recent hospital admissions, illness, fevers, chills, nausea, diarrhea.  No changes to medications.  2.  Procedure, risks, expectations, recovery discussed with patient and wife.  All questions answered.  Patient in agreement for procedure.  Consent obtained.  3.  T&S sent, PRBC, FFP, PLT on hold.   4.  Plan to proceed with transfemoral TAVR today with Momin Bettina 3 valve

## 2018-10-09 NOTE — H&P ADULT - FAMILY HISTORY
Father  Still living? Unknown  Family history of coronary artery disease, Age at diagnosis: Age Unknown     Mother  Still living? Unknown  Family history of pancreatic cancer, Age at diagnosis: Age Unknown

## 2018-10-09 NOTE — PROGRESS NOTE ADULT - SUBJECTIVE AND OBJECTIVE BOX
INTERVAL HPI/OVERNIGHT EVENTS:    Op Day TAVR  EF normal    85yo male with Hx HTN, HLD, Waldenstrom's macroglobulinemia anemia, monoclonal gammopathy (heme following), pAFib/flutter (not on AC), CHF (chronic diastolic), severe aortic stenosis admitted with recent CHF exacerbation     sxs inc fatigue x 1 year with lightheadedness while walking on incline in [place] and significant LE edema.   Referred to [facility]  - (+)Fib/RVR - atenolol and converted to sinus  ECHO: severe aortic stenosis.    (+)Anemia - s/p BMBx - Waldenstrom's macroglobulinemia and monoclonal gammopathy.    when Hg < 10 he receives Aranesp.    To OR today - TAVR - femoral      PAST MEDICAL & SURGICAL HISTORY:  Aortic stenosis  Chronic diastolic (congestive) heart failure  Paroxysmal atrial fibrillation  Monoclonal gammopathy  Waldenstrom macroglobulinemia  Hyperlipidemia  Hypertension  History of prostate surgery  H/O knee surgery  H/O hernia repair  Bilateral cataracts        ICU Vital Signs Last 24 Hrs  T(C): 36.2 (09 Oct 2018 16:40), Max: 36.2 (09 Oct 2018 16:40)  T(F): 97.2 (09 Oct 2018 16:40), Max: 97.2 (09 Oct 2018 16:40)  HR: 46 (09 Oct 2018 17:15) (42 - 46)  BP: --  BP(mean): --  ABP: 140/52 (09 Oct 2018 17:15) (126/42 - 140/52)  ABP(mean): 84 (09 Oct 2018 17:15) (72 - 84)  RR: 18 (09 Oct 2018 17:15) (8 - 18)  SpO2: 100% (09 Oct 2018 17:15) (100% - 100%)    Qtts:     I&O's Summary    09 Oct 2018 07:01  -  09 Oct 2018 17:27  --------------------------------------------------------  IN: 10 mL / OUT: 0 mL / NET: 10 mL            Physical Exam    Heart  Lungs  Abd  Ext  Chest  Neuro  Skin    LABS:                        9.5    5.0   )-----------( 191      ( 09 Oct 2018 16:52 )             28.7     10-09    137  |  98  |  21  ----------------------------<  91  4.2   |  31  |  0.88    Ca    9.6      09 Oct 2018 10:55  Phos  4.0     10-09  Mg     2.2     10-09    TPro  6.8  /  Alb  3.6  /  TBili  0.6  /  DBili  x   /  AST  20  /  ALT  11  /  AlkPhos  71  10-09    PT/INR - ( 09 Oct 2018 16:52 )   PT: 13.0 sec;   INR: 1.17          PTT - ( 09 Oct 2018 16:52 )  PTT:38.1 sec    ABG - ( 09 Oct 2018 16:52 )  pH, Arterial: 7.39  pH, Blood: x     /  pCO2: 49    /  pO2: 186   / HCO3: 29    / Base Excess: 3.7   /  SaO2: 99                  RADIOLOGY & ADDITIONAL STUDIES:    I have spent/provided stated minutes of critical care time to this patient: INTERVAL HPI/OVERNIGHT EVENTS:    Op Day TAVR  EF normal    85yo male with Hx HTN, HLD, Waldenstrom's macroglobulinemia anemia, monoclonal gammopathy (heme following), pAFib/flutter (not on AC), CHF (chronic diastolic), severe aortic stenosis admitted with recent CHF exacerbation     sxs inc fatigue x 1 year with lightheadedness while walking on incline in [place] and significant LE edema.   Referred to [facility]  - (+)Fib/RVR - atenolol and converted to sinus  ECHO: severe aortic stenosis.    (+)Anemia - s/p BMBx - Waldenstrom's macroglobulinemia and monoclonal gammopathy.    when Hg < 10 he receives Aranesp.    To OR today - TAVR - femoral. uneventful by report - no conduction abnormality reported periprocedure  bradycardic per anesthesia (50's)  TVP IJ - on backup    PMHx includes but is not limited to:   Aortic stenosis  Chronic diastolic (congestive) heart failure  Paroxysmal atrial fibrillation  Monoclonal gammopathy  Waldenstrom macroglobulinemia  Hyperlipidemia  Hypertension  History of prostate surgery  H/O knee surgery  H/O hernia repair  Bilateral cataracts    ICU Vital Signs Last 24 Hrs  T(C): 36.2 (09 Oct 2018 16:40), Max: 36.2 (09 Oct 2018 16:40)  T(F): 97.2 (09 Oct 2018 16:40), Max: 97.2 (09 Oct 2018 16:40)  HR: 46 (09 Oct 2018 17:15) (42 - 46) sinus  ABP: 140/52 (09 Oct 2018 17:15) (126/42 - 140/52)  ABP(mean): 84 (09 Oct 2018 17:15) (72 - 84)  SpO2: 100% (09 Oct 2018 17:15) (100% - 100%) 4 L NC    Qtts: None    I&O's Summary    09 Oct 2018 07:01  -  09 Oct 2018 17:27  --------------------------------------------------------  IN: 10 mL / OUT: 0 mL / NET: 10 mL    Physical Exam    Heart - regular (-)rub/gallop  Lungs - CTA anterior (-)rub/gallop  Abd - (+)BS soft NDNT (-)r/r/g  Ext - warm to touch ; no cyanosis/clubbing   Neuro - alert/oriented and interactive; non-focal  Skin - no rash    LABS:                        9.5    5.0   )-----------( 191      ( 09 Oct 2018 16:52 )             28.7     10-09    137  |  98  |  21  ----------------------------<  91  4.2   |  31  |  0.88    Ca    9.6      09 Oct 2018 10:55  Phos  4.0     10-09  Mg     2.2     10-09    TPro  6.8  /  Alb  3.6  /  TBili  0.6  /  DBili  x   /  AST  20  /  ALT  11  /  AlkPhos  71  10-09    PT/INR - ( 09 Oct 2018 16:52 )   PT: 13.0 sec;   INR: 1.17     PTT - ( 09 Oct 2018 16:52 )  PTT:38.1 sec    ABG - ( 09 Oct 2018 16:52 ) 7.39/49/182/99    RADIOLOGY & ADDITIONAL STUDIES: reviewed    patient with symptomatic aortic stenosis and recent CHF exacerbation (now resolved) now s/p TAVR    1. CV  TVP on backup  complete periop Abx prophylaxis  sinus at this time - rachana - on beta blocker prior - monitor for now  EP called by structural heart  anticipate ECHO and EKG in am per protocol  ASA/Plavix  monitor cannulation sites    2. Heme  chronic anemia - monitor counts  recent BMBx as above    glycemic control    DVT and GI prophylaxis    d/w patient/anesthesia and structural heart    I have spent/provided stated minutes of critical care time to this patient: 60 min

## 2018-10-10 LAB
ALBUMIN SERPL ELPH-MCNC: 2.8 G/DL — LOW (ref 3.3–5)
ALP SERPL-CCNC: 62 U/L — SIGNIFICANT CHANGE UP (ref 40–120)
ALT FLD-CCNC: 8 U/L — LOW (ref 10–45)
ANION GAP SERPL CALC-SCNC: 11 MMOL/L — SIGNIFICANT CHANGE UP (ref 5–17)
APTT BLD: 36.2 SEC — SIGNIFICANT CHANGE UP (ref 27.5–37.4)
AST SERPL-CCNC: 18 U/L — SIGNIFICANT CHANGE UP (ref 10–40)
BILIRUB SERPL-MCNC: 0.4 MG/DL — SIGNIFICANT CHANGE UP (ref 0.2–1.2)
BUN SERPL-MCNC: 19 MG/DL — SIGNIFICANT CHANGE UP (ref 7–23)
CALCIUM SERPL-MCNC: 8.8 MG/DL — SIGNIFICANT CHANGE UP (ref 8.4–10.5)
CHLORIDE SERPL-SCNC: 102 MMOL/L — SIGNIFICANT CHANGE UP (ref 96–108)
CO2 SERPL-SCNC: 25 MMOL/L — SIGNIFICANT CHANGE UP (ref 22–31)
CREAT SERPL-MCNC: 0.84 MG/DL — SIGNIFICANT CHANGE UP (ref 0.5–1.3)
GAS PNL BLDA: SIGNIFICANT CHANGE UP
GLUCOSE BLDC GLUCOMTR-MCNC: 108 MG/DL — HIGH (ref 70–99)
GLUCOSE BLDC GLUCOMTR-MCNC: 120 MG/DL — HIGH (ref 70–99)
GLUCOSE BLDC GLUCOMTR-MCNC: 80 MG/DL — SIGNIFICANT CHANGE UP (ref 70–99)
GLUCOSE SERPL-MCNC: 87 MG/DL — SIGNIFICANT CHANGE UP (ref 70–99)
HCT VFR BLD CALC: 30.5 % — LOW (ref 39–50)
HGB BLD-MCNC: 10.1 G/DL — LOW (ref 13–17)
INR BLD: 1.15 — SIGNIFICANT CHANGE UP (ref 0.88–1.16)
LACTATE SERPL-SCNC: 0.5 MMOL/L — SIGNIFICANT CHANGE UP (ref 0.5–2)
MAGNESIUM SERPL-MCNC: 1.9 MG/DL — SIGNIFICANT CHANGE UP (ref 1.6–2.6)
MCHC RBC-ENTMCNC: 30.7 PG — SIGNIFICANT CHANGE UP (ref 27–34)
MCHC RBC-ENTMCNC: 33.1 G/DL — SIGNIFICANT CHANGE UP (ref 32–36)
MCV RBC AUTO: 92.7 FL — SIGNIFICANT CHANGE UP (ref 80–100)
PHOSPHATE SERPL-MCNC: 3.2 MG/DL — SIGNIFICANT CHANGE UP (ref 2.5–4.5)
PLATELET # BLD AUTO: 191 K/UL — SIGNIFICANT CHANGE UP (ref 150–400)
POTASSIUM SERPL-MCNC: 3.9 MMOL/L — SIGNIFICANT CHANGE UP (ref 3.5–5.3)
POTASSIUM SERPL-SCNC: 3.9 MMOL/L — SIGNIFICANT CHANGE UP (ref 3.5–5.3)
PROT SERPL-MCNC: 6 G/DL — SIGNIFICANT CHANGE UP (ref 6–8.3)
PROTHROM AB SERPL-ACNC: 12.8 SEC — HIGH (ref 9.8–12.7)
RBC # BLD: 3.29 M/UL — LOW (ref 4.2–5.8)
RBC # FLD: 13 % — SIGNIFICANT CHANGE UP (ref 10.3–16.9)
SODIUM SERPL-SCNC: 138 MMOL/L — SIGNIFICANT CHANGE UP (ref 135–145)
WBC # BLD: 7.4 K/UL — SIGNIFICANT CHANGE UP (ref 3.8–10.5)
WBC # FLD AUTO: 7.4 K/UL — SIGNIFICANT CHANGE UP (ref 3.8–10.5)

## 2018-10-10 PROCEDURE — 71045 X-RAY EXAM CHEST 1 VIEW: CPT | Mod: 26

## 2018-10-10 PROCEDURE — 93306 TTE W/DOPPLER COMPLETE: CPT | Mod: 26

## 2018-10-10 PROCEDURE — 99291 CRITICAL CARE FIRST HOUR: CPT

## 2018-10-10 RX ORDER — DOCUSATE SODIUM 100 MG
100 CAPSULE ORAL THREE TIMES A DAY
Qty: 0 | Refills: 0 | Status: DISCONTINUED | OUTPATIENT
Start: 2018-10-10 | End: 2018-10-11

## 2018-10-10 RX ORDER — FUROSEMIDE 40 MG
20 TABLET ORAL DAILY
Qty: 0 | Refills: 0 | Status: DISCONTINUED | OUTPATIENT
Start: 2018-10-10 | End: 2018-10-11

## 2018-10-10 RX ORDER — SODIUM CHLORIDE 9 MG/ML
3 INJECTION INTRAMUSCULAR; INTRAVENOUS; SUBCUTANEOUS EVERY 8 HOURS
Qty: 0 | Refills: 0 | Status: DISCONTINUED | OUTPATIENT
Start: 2018-10-10 | End: 2018-10-11

## 2018-10-10 RX ORDER — POTASSIUM CHLORIDE 20 MEQ
20 PACKET (EA) ORAL ONCE
Qty: 0 | Refills: 0 | Status: COMPLETED | OUTPATIENT
Start: 2018-10-10 | End: 2018-10-10

## 2018-10-10 RX ORDER — SENNA PLUS 8.6 MG/1
2 TABLET ORAL AT BEDTIME
Qty: 0 | Refills: 0 | Status: DISCONTINUED | OUTPATIENT
Start: 2018-10-10 | End: 2018-10-11

## 2018-10-10 RX ORDER — MAGNESIUM SULFATE 500 MG/ML
2 VIAL (ML) INJECTION ONCE
Qty: 0 | Refills: 0 | Status: COMPLETED | OUTPATIENT
Start: 2018-10-10 | End: 2018-10-10

## 2018-10-10 RX ADMIN — HEPARIN SODIUM 5000 UNIT(S): 5000 INJECTION INTRAVENOUS; SUBCUTANEOUS at 21:33

## 2018-10-10 RX ADMIN — HEPARIN SODIUM 5000 UNIT(S): 5000 INJECTION INTRAVENOUS; SUBCUTANEOUS at 15:51

## 2018-10-10 RX ADMIN — Medication 100 MILLIGRAM(S): at 15:51

## 2018-10-10 RX ADMIN — ATORVASTATIN CALCIUM 10 MILLIGRAM(S): 80 TABLET, FILM COATED ORAL at 21:33

## 2018-10-10 RX ADMIN — HEPARIN SODIUM 5000 UNIT(S): 5000 INJECTION INTRAVENOUS; SUBCUTANEOUS at 06:27

## 2018-10-10 RX ADMIN — Medication 100 MILLIGRAM(S): at 06:26

## 2018-10-10 RX ADMIN — Medication 81 MILLIGRAM(S): at 12:55

## 2018-10-10 RX ADMIN — CLOPIDOGREL BISULFATE 75 MILLIGRAM(S): 75 TABLET, FILM COATED ORAL at 12:54

## 2018-10-10 RX ADMIN — Medication 100 MILLIEQUIVALENT(S): at 04:52

## 2018-10-10 RX ADMIN — Medication 20 MILLIGRAM(S): at 12:59

## 2018-10-10 RX ADMIN — SODIUM CHLORIDE 3 MILLILITER(S): 9 INJECTION INTRAMUSCULAR; INTRAVENOUS; SUBCUTANEOUS at 21:28

## 2018-10-10 RX ADMIN — Medication 50 GRAM(S): at 04:52

## 2018-10-10 RX ADMIN — PANTOPRAZOLE SODIUM 40 MILLIGRAM(S): 20 TABLET, DELAYED RELEASE ORAL at 06:26

## 2018-10-10 RX ADMIN — CHLORHEXIDINE GLUCONATE 1 APPLICATION(S): 213 SOLUTION TOPICAL at 12:59

## 2018-10-10 NOTE — CONSULT NOTE ADULT - SUBJECTIVE AND OBJECTIVE BOX
HISTORY OF PRESENT ILLNESS:   85 y/o male with hx HTN, HLD, Anemia 2nd to Waldenstrom's macroglobulinemia and monoclonal gammopathy (heme following), paroxysmal atrial fibrillation/flutter (not on AC yet), and chronic diastolic heart failure with severe aortic stenosis. He is now s/p TAVR (qamar) on 10/9/18.  Post procedure he developed transient LBBB.  EKG on 10/9/18 showed LBBB with  ms.  No documented CHB during or after the procedure.  He has a Right IJ TVP in place post TAVR but has not required using it overnight.  Today his EKG shows narrower QRS, back to baseline at 94 ms.   Normal OR interval as well.    His HR while here and on outpatient EKG show HR range high 40-low 50s.  He states that he is on Atenolol 25 mg daily for the AFIB episodes with fast HR in recent past.  He states that his cardiologist was thinking about starting him on a blood thinner but is waiting for after his TAVR.  Denies syncope.      Outpatient Cardiologist: Dr. Brant Tapia at [facility].       PAST MEDICAL AND SURGICAL HISTORY:  Aortic stenosis  Chronic diastolic (congestive) heart failure  Paroxysmal atrial fibrillation  Monoclonal gammopathy  Waldenstrom macroglobulinemia  Hyperlipidemia  Hypertension  History of prostate surgery  H/O knee surgery  H/O hernia repair  Bilateral cataracts      FAMILY HISTORY:   Family history of pancreatic cancer (Mother)  Family history of coronary artery disease (Father)      SOCIAL HISTORY:   Denies ETOH  Denies TOB  Denies illicit drugs    REVIEW OF SYSTEM:   CONSTITUTIONAL: Some fatigue. Denies fevers or chills  EYES/ENT: No visual changes;  No vertigo or throat pain   NECK: No pain or stiffness  RESPIRATORY: No cough, wheezing, hemoptysis; No shortness of breath  CARDIOVASCULAR: See HPI.  Denies CP / dizziness. Palpitations in the past (during AFIB RVR episode).  DeniesLE edema.   GASTROINTESTINAL: No abdominal or epigastric pain. No nausea, vomiting, or hematemesis; No diarrhea or constipation. No melena or hematochezia.  GENITOURINARY: No dysuria, frequency or hematuria  MUSCULOSKELETAL: Denies joint pain or swelling   NEUROLOGICAL: No numbness or weakness  SKIN: No itching, burning, rashes, or lesions   All other review of systems is negative unless indicated above.    ALLERGY:  No Known Allergies      INPATIENT MEDICATIONS:  acetaminophen  IVPB .. 1000 milliGRAM(s) IV Intermittent once  aspirin enteric coated 81 milliGRAM(s) Oral daily  atorvastatin 10 milliGRAM(s) Oral at bedtime  ceFAZolin   IVPB 2000 milliGRAM(s) IV Intermittent every 8 hours  chlorhexidine 2% Cloths 1 Application(s) Topical daily  clopidogrel Tablet 75 milliGRAM(s) Oral daily  docusate sodium 100 milliGRAM(s) Oral three times a day  furosemide    Tablet 20 milliGRAM(s) Oral daily  glucagon  Injectable 1 milliGRAM(s) IntraMuscular once PRN  heparin  Injectable 5000 Unit(s) SubCutaneous every 8 hours  hydrALAZINE Injectable 10 milliGRAM(s) IV Push every 6 hours PRN  insulin lispro (HumaLOG) corrective regimen sliding scale   SubCutaneous Before meals and at bedtime  pantoprazole    Tablet 40 milliGRAM(s) Oral before breakfast  senna 2 Tablet(s) Oral at bedtime  sodium chloride 0.9%. 1000 milliLiter(s) IV Continuous <Continuous>      VITAL SIGNS:   T(C): 36.6 (10-10-18 @ 10:21), Max: 36.6 (10-10-18 @ 05:01)  HR: 62 (10-10-18 @ 09:00) (42 - 62)  BP: 155/69 (10-09-18 @ 23:00) (155/69 - 155/69)  RR: 23 (10-10-18 @ 09:00) (8 - 23)  SpO2: 98% (10-10-18 @ 09:00) (95% - 100%)      PHYSICAL EXAM:   Appearance: NAD   Neck: Right IJ TVP  CVS: S1/S2 normal, rachana, no murmur  Pulm: slight decrease at right base. no rale or wheeze  Abd:  +BS, Soft, NT/ND	  Ext: No LE edema  Neuro: AAO x 3. no focal deficit.     LABS:                        10.1   7.4   )-----------( 191      ( 10 Oct 2018 03:27 )             30.5     10-10    138  |  102  |  19  ----------------------------<  87  3.9   |  25  |  0.84    Ca    8.8      10 Oct 2018 03:27  Phos  3.2     10-10  Mg     1.9     10-10    TPro  6.0  /  Alb  2.8<L>  /  TBili  0.4  /  DBili  x   /  AST  18  /  ALT  8<L>  /  AlkPhos  62  10-10    PT/INR - ( 10 Oct 2018 03:27 )   PT: 12.8 sec;   INR: 1.15          PTT - ( 10 Oct 2018 03:27 )  PTT:36.2 sec  LIVER FUNCTIONS - ( 10 Oct 2018 03:27 )  Alb: 2.8 g/dL / Pro: 6.0 g/dL / ALK PHOS: 62 U/L / ALT: 8 U/L / AST: 18 U/L / GGT: x             EK18: Sinus rachana 42 bpm. .   ms. QTc 487  Post TAVR 10/9/18: SB 43 bpm.  .   ms.  QTc 491 ms  10/10/18: SB 57 bpm.  . QRS 94 ms. QTc 445     Telemetry: sinus rachana high 40-50s.  No atrial arrhythmia     ECHO: 18: LVEF 73%.  LA 46 cc3/m2.

## 2018-10-10 NOTE — PROGRESS NOTE ADULT - SUBJECTIVE AND OBJECTIVE BOX
INTERVAL HPI/OVERNIGHT EVENTS:    POD#1 TAVR (qaamr)  EF normal    83yo male with Hx HTN, HLD, Waldenstrom's macroglobulinemia anemia, monoclonal gammopathy (heme following), pAFib/flutter (not on AC), CHF (chronic diastolic), severe aortic stenosis admitted with recent CHF exacerbation     sxs inc fatigue x 1 year with lightheadedness while walking on incline in [place] and significant LE edema.   Referred to [facility]  - (+)Fib/RVR - atenolol and converted to sinus  ECHO: severe aortic stenosis.    (+)Anemia - s/p BMBx - Waldenstrom's macroglobulinemia and monoclonal gammopathy.    when Hg < 10 he receives Aranesp.    To OR today - TAVR - femoral. uneventful by report - no conduction abnormality reported periprocedure initially but reportedly periprocedural LBBB by structural heart  bradycardic per anesthesia (50's) - pre-procedural beta-blocker felt to be etiology  TVP IJ - on backup  EP called to evaluate     PMHx includes but is not limited to:   Aortic stenosis  Chronic diastolic (congestive) heart failure  Paroxysmal atrial fibrillation  Monoclonal gammopathy  Waldenstrom macroglobulinemia  Hyperlipidemia  Hypertension  History of prostate surgery  H/O knee surgery  H/O hernia repair  Bilateral cataract      ICU Vital Signs Last 24 Hrs  T(C): 36.6 (10 Oct 2018 05:01), Max: 36.6 (10 Oct 2018 05:01)  T(F): 97.9 (10 Oct 2018 05:01), Max: 97.9 (10 Oct 2018 05:01)  HR: 62 (10 Oct 2018 09:00) (42 - 62)  BP: 155/69 (09 Oct 2018 23:00) (155/69 - 155/69)  BP(mean): 99 (09 Oct 2018 23:00) (99 - 99)  ABP: 144/60 (10 Oct 2018 09:00) (116/40 - 176/62)  ABP(mean): 92 (10 Oct 2018 09:00) (66 - 104)  SpO2: 98% (10 Oct 2018 09:00) (95% - 100%) RA    Qtts: None    I&O's Summary    09 Oct 2018 07:01  -  10 Oct 2018 07:00  --------------------------------------------------------  IN: 490 mL / OUT: 1150 mL / NET: -660 mL    10 Oct 2018 07:01  -  10 Oct 2018 10:15  --------------------------------------------------------  IN: 10 mL / OUT: 100 mL / NET: -90 mL            Physical Exam    Heart  Lungs  Abd  Ext  Chest  Neuro  Skin    LABS:                        10.1   7.4   )-----------( 191      ( 10 Oct 2018 03:27 )             30.5     10-10    138  |  102  |  19  ----------------------------<  87  3.9   |  25  |  0.84    Ca    8.8      10 Oct 2018 03:27  Phos  3.2     10-10  Mg     1.9     10-10    TPro  6.0  /  Alb  2.8<L>  /  TBili  0.4  /  DBili  x   /  AST  18  /  ALT  8<L>  /  AlkPhos  62  10-10    PT/INR - ( 10 Oct 2018 03:27 )   PT: 12.8 sec;   INR: 1.15          PTT - ( 10 Oct 2018 03:27 )  PTT:36.2 sec    ABG - ( 10 Oct 2018 03:41 )  pH, Arterial: 7.40  pH, Blood: x     /  pCO2: 44    /  pO2: 84    / HCO3: 27    / Base Excess: 2.0   /  SaO2: 96                  RADIOLOGY & ADDITIONAL STUDIES:    I have spent/provided stated minutes of critical care time to this patient: INTERVAL HPI/OVERNIGHT EVENTS:    POD#1 TAVR (qamar)  EF normal    83yo male with Hx HTN, HLD, Waldenstrom's macroglobulinemia anemia, monoclonal gammopathy (heme following), pAFib/flutter (not on AC), CHF (chronic diastolic), severe aortic stenosis admitted with recent CHF exacerbation     sxs inc fatigue x 1 year with lightheadedness while walking on incline in [place] and significant LE edema.   Referred to [facility]  - (+)Fib/RVR - atenolol and converted to sinus  ECHO: severe aortic stenosis.    (+)Anemia - s/p BMBx - Waldenstrom's macroglobulinemia and monoclonal gammopathy.    when Hg < 10 he receives Aranesp.    To OR today - TAVR - femoral. uneventful by report - no conduction abnormality reported periprocedure initially but reportedly periprocedural LBBB by structural heart  bradycardic per anesthesia (50's) - pre-procedural beta-blocker felt to be etiology  TVP IJ - on backup  EP called to evaluate and present at bedside at this time    no acute events reported overnight    PMHx includes but is not limited to:   Aortic stenosis  Chronic diastolic (congestive) heart failure  Paroxysmal atrial fibrillation  Monoclonal gammopathy  Waldenstrom macroglobulinemia  Hyperlipidemia  Hypertension  History of prostate surgery  H/O knee surgery  H/O hernia repair  Bilateral cataract      ICU Vital Signs Last 24 Hrs  T(C): 36.6 (10 Oct 2018 05:01), Max: 36.6 (10 Oct 2018 05:01)  T(F): 97.9 (10 Oct 2018 05:01), Max: 97.9 (10 Oct 2018 05:01)  HR: 62 (10 Oct 2018 09:00) (42 - 62) sinus  BP: 155/69 (09 Oct 2018 23:00) (155/69 - 155/69)  BP(mean): 99 (09 Oct 2018 23:00) (99 - 99)  ABP: 144/60 (10 Oct 2018 09:00) (116/40 - 176/62)  ABP(mean): 92 (10 Oct 2018 09:00) (66 - 104)  SpO2: 98% (10 Oct 2018 09:00) (95% - 100%) RA    Qtts: None    I&O's Summary    09 Oct 2018 07:01  -  10 Oct 2018 07:00  --------------------------------------------------------  IN: 490 mL / OUT: 1150 mL / NET: -660 mL    10 Oct 2018 07:01  -  10 Oct 2018 10:15  --------------------------------------------------------  IN: 10 mL / OUT: 100 mL / NET: -90 mL    Physical Exam    Heart - regular (-)rub/gallop  Lungs - (+)CTA anterior/posterior (-)r/r/w  Abd - (+)  Ext  Chest  Neuro  Skin    LABS:                        10.1   7.4   )-----------( 191      ( 10 Oct 2018 03:27 )             30.5     10-10    138  |  102  |  19  ----------------------------<  87  3.9   |  25  |  0.84    Ca    8.8      10 Oct 2018 03:27  Phos  3.2     10-10  Mg     1.9     10-10    TPro  6.0  /  Alb  2.8<L>  /  TBili  0.4  /  DBili  x   /  AST  18  /  ALT  8<L>  /  AlkPhos  62  10-10    PT/INR - ( 10 Oct 2018 03:27 )   PT: 12.8 sec;   INR: 1.15          PTT - ( 10 Oct 2018 03:27 )  PTT:36.2 sec    ABG - ( 10 Oct 2018 03:41 )  pH, Arterial: 7.40  pH, Blood: x     /  pCO2: 44    /  pO2: 84    / HCO3: 27    / Base Excess: 2.0   /  SaO2: 96                  RADIOLOGY & ADDITIONAL STUDIES:    I have spent/provided stated minutes of critical care time to this patient: INTERVAL HPI/OVERNIGHT EVENTS:    POD#1 TAVR (qamar)  EF normal    83yo male with Hx HTN, HLD, Waldenstrom's macroglobulinemia anemia, monoclonal gammopathy (heme following), pAFib/flutter (not on AC), CHF (chronic diastolic), severe aortic stenosis admitted with recent CHF exacerbation     sxs inc fatigue x 1 year with lightheadedness while walking on incline in [place] and significant LE edema.   Referred to [facility]  - (+)Fib/RVR - atenolol and converted to sinus  ECHO: severe aortic stenosis.    (+)Anemia - s/p BMBx - Waldenstrom's macroglobulinemia and monoclonal gammopathy.    when Hg < 10 he receives Aranesp.    To OR today - TAVR - femoral. uneventful by report - no conduction abnormality reported periprocedure initially but reportedly periprocedural LBBB by structural heart  bradycardic per anesthesia (50's) - pre-procedural beta-blocker felt to be etiology  TVP IJ - on backup  EP called to evaluate and present at bedside at this time    no acute events reported overnight    PMHx includes but is not limited to:   Aortic stenosis  Chronic diastolic (congestive) heart failure  Paroxysmal atrial fibrillation  Monoclonal gammopathy  Waldenstrom macroglobulinemia  Hyperlipidemia  Hypertension  History of prostate surgery  H/O knee surgery  H/O hernia repair  Bilateral cataract      ICU Vital Signs Last 24 Hrs  T(C): 36.6 (10 Oct 2018 05:01), Max: 36.6 (10 Oct 2018 05:01)  T(F): 97.9 (10 Oct 2018 05:01), Max: 97.9 (10 Oct 2018 05:01)  HR: 62 (10 Oct 2018 09:00) (42 - 62) sinus  BP: 155/69 (09 Oct 2018 23:00) (155/69 - 155/69)  BP(mean): 99 (09 Oct 2018 23:00) (99 - 99)  ABP: 144/60 (10 Oct 2018 09:00) (116/40 - 176/62)  ABP(mean): 92 (10 Oct 2018 09:00) (66 - 104)  SpO2: 98% (10 Oct 2018 09:00) (95% - 100%) RA    Qtts: None    I&O's Summary    09 Oct 2018 07:01  -  10 Oct 2018 07:00  --------------------------------------------------------  IN: 490 mL / OUT: 1150 mL / NET: -660 mL    10 Oct 2018 07:01  -  10 Oct 2018 10:15  --------------------------------------------------------  IN: 10 mL / OUT: 100 mL / NET: -90 mL    Physical Exam    Heart - regular (-)rub/gallop  Lungs - (+)CTA anterior/posterior (-)r/r/w  Abd - (+)BS soft NTND (-)r/r/g  Ext - warm to touch; no cyanosis/clubbing; cannulation sites clean and dry  Neuro - alert/oriented and interactive; moving all extremities and non-focal  Skin - no rash    LABS:                        10.1   7.4   )-----------( 191      ( 10 Oct 2018 03:27 )             30.5     10-10    138  |  102  |  19  ----------------------------<  87  3.9   |  25  |  0.84    Ca    8.8      10 Oct 2018 03:27  Phos  3.2     10-10  Mg     1.9     10-10    TPro  6.0  /  Alb  2.8<L>  /  TBili  0.4  /  DBili  x   /  AST  18  /  ALT  8<L>  /  AlkPhos  62  10-10    PT/INR - ( 10 Oct 2018 03:27 )   PT: 12.8 sec;   INR: 1.15     PTT - ( 10 Oct 2018 03:27 )  PTT:36.2 sec    ABG - ( 10 Oct 2018 03:41 ) 7.4/44/84/96    RADIOLOGY & ADDITIONAL STUDIES: reviewed    Patient with aortic stenosis now s/p TAVR with periprocedural BBB noted.    1. CV  hemodynamically stable  bradycardic pre-procedure - suspect beta blocker related  no beta blocker to be restarted  EP consulted  ECHO today per protocol  ASA/Plavix/statin  lasix 20mg po qg  complete periop Abx prophylaxis    bowel regimen   DVT and GI prophylaxis    d/w patient/staff/EP and structural heart    I have spent/provided stated minutes of critical care time to this patient: 60 min

## 2018-10-10 NOTE — CONSULT NOTE ADULT - ASSESSMENT
85 y/o male with hx HTN, HLD, Anemia 2nd to Waldenstrom's macroglobulinemia and monoclonal gammopathy (heme following), pAFIB / flutter (not on AC yet), and chronic diastolic heart failure with severe AS.  He is now s/p TAVR (qamar) on 10/9/18.  Post procedure he developed transient LBBB.  EKG on 10/9/18 showed LBBB with  ms.  No documented CHB during or after the procedure.  He has a Right IJ TVP in place post TAVR but has not required using it overnight.  Today his EKG shows narrower QRS, back to baseline at 94 ms.   Normal LA interval as well.  His HR while here and on outpatient EKG show HR range high 40-low 50s.    - Keep TVP in Right IJ today. Continue to monitor tele today. If QRS remains narrow, may not need PPM.  However, given sinus rachana and the need for beta-blocker for AFIB RVR, will discuss the utility of PPM for tachy-rachana.   - Pending round with EP attending today 85 y/o male with hx HTN, HLD, Anemia 2nd to Waldenstrom's macroglobulinemia and monoclonal gammopathy (heme following), pAFIB / flutter (not on AC yet), and chronic diastolic heart failure with severe AS.  He is now s/p TAVR (qamar) on 10/9/18.  Post procedure he developed transient LBBB.  EKG on 10/9/18 showed LBBB with  ms.  No documented CHB during or after the procedure.  He has a Right IJ TVP in place post TAVR but has not required using it overnight.  Today his EKG shows narrower QRS, back to baseline at 94 ms.   Normal ID interval as well.  His HR while here and on outpatient EKG show HR range high 40-low 50s.    - Keep TVP in Right IJ today. Continue to monitor tele today. If QRS remains narrow, may not need PPM.  However, given sinus rachana and the need for beta-blocker for AFIB RVR, will discuss the utility of PPM for tachy-rachana. Will discuss with DR. Landaverde and his outpatient cardiologist Dr. Tapia.

## 2018-10-11 ENCOUNTER — TRANSCRIPTION ENCOUNTER (OUTPATIENT)
Age: 83
End: 2018-10-11

## 2018-10-11 VITALS — TEMPERATURE: 97 F

## 2018-10-11 PROBLEM — I48.0 PAROXYSMAL ATRIAL FIBRILLATION: Chronic | Status: ACTIVE | Noted: 2018-10-09

## 2018-10-11 PROBLEM — I50.32 CHRONIC DIASTOLIC (CONGESTIVE) HEART FAILURE: Chronic | Status: ACTIVE | Noted: 2018-10-09

## 2018-10-11 PROBLEM — D47.2 MONOCLONAL GAMMOPATHY: Chronic | Status: ACTIVE | Noted: 2018-10-09

## 2018-10-11 PROBLEM — I10 ESSENTIAL (PRIMARY) HYPERTENSION: Chronic | Status: ACTIVE | Noted: 2018-10-09

## 2018-10-11 PROBLEM — E78.5 HYPERLIPIDEMIA, UNSPECIFIED: Chronic | Status: ACTIVE | Noted: 2018-10-09

## 2018-10-11 PROBLEM — C88.0 WALDENSTROM MACROGLOBULINEMIA: Chronic | Status: ACTIVE | Noted: 2018-10-09

## 2018-10-11 PROBLEM — I35.0 NONRHEUMATIC AORTIC (VALVE) STENOSIS: Chronic | Status: ACTIVE | Noted: 2018-10-09

## 2018-10-11 LAB
ALBUMIN SERPL ELPH-MCNC: 3 G/DL — LOW (ref 3.3–5)
ALP SERPL-CCNC: 61 U/L — SIGNIFICANT CHANGE UP (ref 40–120)
ALT FLD-CCNC: 6 U/L — LOW (ref 10–45)
ANION GAP SERPL CALC-SCNC: 7 MMOL/L — SIGNIFICANT CHANGE UP (ref 5–17)
APTT BLD: 35.7 SEC — SIGNIFICANT CHANGE UP (ref 27.5–37.4)
AST SERPL-CCNC: 16 U/L — SIGNIFICANT CHANGE UP (ref 10–40)
BILIRUB SERPL-MCNC: 0.5 MG/DL — SIGNIFICANT CHANGE UP (ref 0.2–1.2)
BUN SERPL-MCNC: 14 MG/DL — SIGNIFICANT CHANGE UP (ref 7–23)
CALCIUM SERPL-MCNC: 8.6 MG/DL — SIGNIFICANT CHANGE UP (ref 8.4–10.5)
CHLORIDE SERPL-SCNC: 102 MMOL/L — SIGNIFICANT CHANGE UP (ref 96–108)
CO2 SERPL-SCNC: 30 MMOL/L — SIGNIFICANT CHANGE UP (ref 22–31)
CREAT SERPL-MCNC: 0.71 MG/DL — SIGNIFICANT CHANGE UP (ref 0.5–1.3)
GLUCOSE BLDC GLUCOMTR-MCNC: 109 MG/DL — HIGH (ref 70–99)
GLUCOSE SERPL-MCNC: 99 MG/DL — SIGNIFICANT CHANGE UP (ref 70–99)
HCT VFR BLD CALC: 30.2 % — LOW (ref 39–50)
HGB BLD-MCNC: 10 G/DL — LOW (ref 13–17)
INR BLD: 1.07 — SIGNIFICANT CHANGE UP (ref 0.88–1.16)
MAGNESIUM SERPL-MCNC: 2.3 MG/DL — SIGNIFICANT CHANGE UP (ref 1.6–2.6)
MCHC RBC-ENTMCNC: 30.9 PG — SIGNIFICANT CHANGE UP (ref 27–34)
MCHC RBC-ENTMCNC: 33.1 G/DL — SIGNIFICANT CHANGE UP (ref 32–36)
MCV RBC AUTO: 93.2 FL — SIGNIFICANT CHANGE UP (ref 80–100)
PHOSPHATE SERPL-MCNC: 2.6 MG/DL — SIGNIFICANT CHANGE UP (ref 2.5–4.5)
PLATELET # BLD AUTO: 190 K/UL — SIGNIFICANT CHANGE UP (ref 150–400)
POTASSIUM SERPL-MCNC: 3.9 MMOL/L — SIGNIFICANT CHANGE UP (ref 3.5–5.3)
POTASSIUM SERPL-SCNC: 3.9 MMOL/L — SIGNIFICANT CHANGE UP (ref 3.5–5.3)
PROT SERPL-MCNC: 6 G/DL — SIGNIFICANT CHANGE UP (ref 6–8.3)
PROTHROM AB SERPL-ACNC: 11.9 SEC — SIGNIFICANT CHANGE UP (ref 9.8–12.7)
RBC # BLD: 3.24 M/UL — LOW (ref 4.2–5.8)
RBC # FLD: 13.1 % — SIGNIFICANT CHANGE UP (ref 10.3–16.9)
SODIUM SERPL-SCNC: 139 MMOL/L — SIGNIFICANT CHANGE UP (ref 135–145)
WBC # BLD: 6.4 K/UL — SIGNIFICANT CHANGE UP (ref 3.8–10.5)
WBC # FLD AUTO: 6.4 K/UL — SIGNIFICANT CHANGE UP (ref 3.8–10.5)

## 2018-10-11 PROCEDURE — 71045 X-RAY EXAM CHEST 1 VIEW: CPT | Mod: 26

## 2018-10-11 PROCEDURE — 99238 HOSP IP/OBS DSCHRG MGMT 30/<: CPT

## 2018-10-11 PROCEDURE — 99232 SBSQ HOSP IP/OBS MODERATE 35: CPT

## 2018-10-11 RX ORDER — POTASSIUM CHLORIDE 20 MEQ
20 PACKET (EA) ORAL ONCE
Qty: 0 | Refills: 0 | Status: DISCONTINUED | OUTPATIENT
Start: 2018-10-11 | End: 2018-10-11

## 2018-10-11 RX ORDER — ATENOLOL 25 MG/1
1 TABLET ORAL
Qty: 0 | Refills: 0 | COMMUNITY

## 2018-10-11 RX ORDER — OMEGA-3 ACID ETHYL ESTERS 1 G
1 CAPSULE ORAL
Qty: 30 | Refills: 0 | OUTPATIENT
Start: 2018-10-11

## 2018-10-11 RX ORDER — ATORVASTATIN CALCIUM 80 MG/1
1 TABLET, FILM COATED ORAL
Qty: 0 | Refills: 0 | COMMUNITY

## 2018-10-11 RX ORDER — POTASSIUM CHLORIDE 20 MEQ
1 PACKET (EA) ORAL
Qty: 30 | Refills: 0 | OUTPATIENT
Start: 2018-10-11

## 2018-10-11 RX ORDER — ASPIRIN/CALCIUM CARB/MAGNESIUM 324 MG
1 TABLET ORAL
Qty: 30 | Refills: 0 | OUTPATIENT
Start: 2018-10-11

## 2018-10-11 RX ORDER — OMEGA-3 ACID ETHYL ESTERS 1 G
1 CAPSULE ORAL
Qty: 0 | Refills: 0 | COMMUNITY

## 2018-10-11 RX ORDER — FUROSEMIDE 40 MG
1 TABLET ORAL
Qty: 10 | Refills: 0 | OUTPATIENT
Start: 2018-10-11

## 2018-10-11 RX ORDER — CLOPIDOGREL BISULFATE 75 MG/1
1 TABLET, FILM COATED ORAL
Qty: 30 | Refills: 0 | OUTPATIENT
Start: 2018-10-11

## 2018-10-11 RX ORDER — POTASSIUM CHLORIDE 20 MEQ
20 PACKET (EA) ORAL DAILY
Qty: 0 | Refills: 0 | Status: CANCELLED | OUTPATIENT
Start: 2018-10-12 | End: 2018-10-11

## 2018-10-11 RX ORDER — ASPIRIN/CALCIUM CARB/MAGNESIUM 324 MG
1 TABLET ORAL
Qty: 0 | Refills: 0 | COMMUNITY

## 2018-10-11 RX ORDER — POTASSIUM CHLORIDE 20 MEQ
20 PACKET (EA) ORAL ONCE
Qty: 0 | Refills: 0 | Status: COMPLETED | OUTPATIENT
Start: 2018-10-11 | End: 2018-10-11

## 2018-10-11 RX ORDER — ATORVASTATIN CALCIUM 80 MG/1
1 TABLET, FILM COATED ORAL
Qty: 30 | Refills: 0 | OUTPATIENT
Start: 2018-10-11

## 2018-10-11 RX ORDER — FUROSEMIDE 40 MG
1 TABLET ORAL
Qty: 0 | Refills: 0 | COMMUNITY

## 2018-10-11 RX ADMIN — Medication 20 MILLIEQUIVALENT(S): at 06:36

## 2018-10-11 RX ADMIN — Medication 20 MILLIGRAM(S): at 06:36

## 2018-10-11 RX ADMIN — Medication 81 MILLIGRAM(S): at 10:49

## 2018-10-11 RX ADMIN — HEPARIN SODIUM 5000 UNIT(S): 5000 INJECTION INTRAVENOUS; SUBCUTANEOUS at 06:36

## 2018-10-11 RX ADMIN — SODIUM CHLORIDE 3 MILLILITER(S): 9 INJECTION INTRAMUSCULAR; INTRAVENOUS; SUBCUTANEOUS at 05:25

## 2018-10-11 RX ADMIN — CLOPIDOGREL BISULFATE 75 MILLIGRAM(S): 75 TABLET, FILM COATED ORAL at 10:49

## 2018-10-11 NOTE — DISCHARGE NOTE ADULT - CARE PROVIDER_API CALL
Nir Landaverde), Cardiology; Interventional Cardiology  [address]  [address] [place] [address]  Phone: [phone]  Fax: [phone]    Rosendo Cabrera), Cardiac Electrophysiology; Cardiovascular Disease; Internal Medicine  [address] [place] [address]  Phone: [phone]  Fax: [phone]

## 2018-10-11 NOTE — PROGRESS NOTE ADULT - SUBJECTIVE AND OBJECTIVE BOX
Patient discussed on morning rounds with Dr. Landaverde     Operation / Date: 10/9/18 TF TAVR    SUBJECTIVE ASSESSMENT:  84y Male feeling well, resting comfortably in chair.  No overnight complaints.  Voiding freely, no BM since procedure.  No CP, SOB, palpitations, N/V/D.    Vital Signs Last 24 Hrs  T(C): 36.2 (11 Oct 2018 06:45), Max: 37 (10 Oct 2018 20:33)  T(F): 97.2 (11 Oct 2018 06:45), Max: 98.6 (10 Oct 2018 20:33)  HR: 64 (11 Oct 2018 09:00) (52 - 72)  BP: 111/59 (11 Oct 2018 09:00) (111/59 - 146/64)  BP(mean): 80 (11 Oct 2018 09:00) (80 - 106)  RR: 17 (11 Oct 2018 09:00) (13 - 27)  SpO2: 95% (11 Oct 2018 09:00) (95% - 99%)  I&O's Detail    10 Oct 2018 07:01  -  11 Oct 2018 07:00  --------------------------------------------------------  IN:    Oral Fluid: 950 mL    sodium chloride 0.9%: 90 mL  Total IN: 1040 mL    OUT:    Voided: 1810 mL  Total OUT: 1810 mL    Total NET: -770 mL    CHEST TUBE:  No.  MEE DRAIN:  No.  EPICARDIAL WIRES: No.  TIE DOWNS: No.  JI: No.    PHYSICAL EXAM:  General: WD thin male  Neurological: AA&O x3, no focal defecits  Cardiovascular: S1S2, no murmur appreciated  Respiratory: b/l breath sounds  Gastrointestinal: soft NTND +NABS  Extremities: moves all 4 ext.  Vascular: WWP  Incision Sites: R groin with steri strips, no hematoma    LABS:             10.0   6.4   )-----------( 190      ( 11 Oct 2018 03:33 )             30.2     COUMADIN:  No    PT/INR - ( 11 Oct 2018 03:34 )   PT: 11.9 sec;   INR: 1.07       PTT - ( 11 Oct 2018 03:34 )  PTT:35.7 sec    10-11    139  |  102  |  14  ----------------------------<  99  3.9   |  30  |  0.71    Ca    8.6      11 Oct 2018 03:33  Phos  2.6     10-11  Mg     2.3     10-11    TPro  6.0  /  Alb  3.0<L>  /  TBili  0.5  /  DBili  x   /  AST  16  /  ALT  6<L>  /  AlkPhos  61  10-11    MEDICATIONS  (STANDING):  aspirin enteric coated 81 milliGRAM(s) Oral daily  atorvastatin 10 milliGRAM(s) Oral at bedtime  chlorhexidine 2% Cloths 1 Application(s) Topical daily  clopidogrel Tablet 75 milliGRAM(s) Oral daily  docusate sodium 100 milliGRAM(s) Oral three times a day  furosemide    Tablet 20 milliGRAM(s) Oral daily  heparin  Injectable 5000 Unit(s) SubCutaneous every 8 hours  pantoprazole    Tablet 40 milliGRAM(s) Oral before breakfast  senna 2 Tablet(s) Oral at bedtime    RADIOLOGY & ADDITIONAL TESTS:  10/10/18: s/p Bettina device  The left atrial size is normal. Right atrial size is normal.Bettina valve   in   aortic position. There is mild aortic regurgitation. The calculated   aortic   valve EOA  is 1.6 cm2. The calculated aortic valve EOAI  is 1 cm2/m2. The   dimensionless index (ratio of LVOTvelocity to aortic velocity) was   calculated   to be 0.56. The mean pressure gradient is 9 mmHg. The peak pressure   gradient   is 14 mmHg.  There is mild mitral valve thickening. There is mild mitral  annular calcification. There is trace mitral regurgitation.  Structurally   normal tricuspid valve. There is trace to mild tricuspid regurgitation.     The   pulmonary artery systolic pressure is estimated to be 25 mmHg.The   pulmonic   valve is not well visualized. There is moderate pulmonic regurgitation.    The   right ventricle is not well visualized.There is mild concentric left   ventricular hypertrophy. The left ventricular wall motion is normal. The   left   ventricular ejection fraction is estimated to be 50-55%  No aortic root   dilatation.There is no pericardial effusion.

## 2018-10-11 NOTE — DISCHARGE NOTE ADULT - CARE PROVIDERS DIRECT ADDRESSES
[email or web address]

## 2018-10-11 NOTE — PROGRESS NOTE ADULT - SUBJECTIVE AND OBJECTIVE BOX
CTICU  CRITICAL  CARE  attending     Hand off received @ 7a					   Pertinent clinical, laboratory, radiographic, hemodynamic, echocardiographic, respiratory data, microbiologic data and chart were reviewed and analyzed frequently throughout the course of the day and night  Patient seen and examined with CTS/ SH attending at bedside    Pt is a 84y , Male, day # 2 s/p TF-TAVR    today    no conduction abnormalities  hemodynamically stable      , FAMILY HISTORY:  Family history of pancreatic cancer (Mother)  Family history of coronary artery disease (Father)  PAST MEDICAL & SURGICAL HISTORY:  Aortic stenosis  Chronic diastolic (congestive) heart failure  Paroxysmal atrial fibrillation  Monoclonal gammopathy  Waldenstrom macroglobulinemia  Hyperlipidemia  Hypertension  History of prostate surgery  H/O knee surgery  H/O hernia repair  Bilateral cataracts    Patient is a 84y old  Male who presents with a chief complaint of TAVR (11 Oct 2018 09:38)      14 system review was unremarkable  acute changes include acute respiratory failure  Vital signs, hemodynamic and respiratory parameters were reviewed from the bedside nursing flowsheet.  ICU Vital Signs Last 24 Hrs  T(C): 36.2 (11 Oct 2018 10:04), Max: 37 (10 Oct 2018 20:33)  T(F): 97.2 (11 Oct 2018 10:04), Max: 98.6 (10 Oct 2018 20:33)  HR: 58 (11 Oct 2018 10:00) (52 - 64)  BP: 111/59 (11 Oct 2018 09:00) (111/59 - 146/64)  BP(mean): 80 (11 Oct 2018 09:00) (80 - 106)  ABP: 144/48 (10 Oct 2018 17:00) (144/48 - 144/48)  ABP(mean): 76 (10 Oct 2018 17:00) (76 - 76)  RR: 21 (11 Oct 2018 10:00) (13 - 21)  SpO2: 98% (11 Oct 2018 10:00) (95% - 99%)    Adult Advanced Hemodynamics Last 24 Hrs  CVP(mm Hg): --  CVP(cm H2O): --  CO: --  CI: --  PA: --  PA(mean): --  PCWP: --  SVR: --  SVRI: --  PVR: --  PVRI: --, ABG - ( 10 Oct 2018 03:41 )  pH, Arterial: 7.40  pH, Blood: x     /  pCO2: 44    /  pO2: 84    / HCO3: 27    / Base Excess: 2.0   /  SaO2: 96                  Intake and output was reviewed and the fluid balance was calculated  Daily     Daily Weight in k.9 (11 Oct 2018 08:21)  I&O's Summary    10 Oct 2018 07:01  -  11 Oct 2018 07:00  --------------------------------------------------------  IN: 1040 mL / OUT: 1810 mL / NET: -770 mL        All lines and drain sites were assessed  Glycemic trend was [facility]      POCT Blood Glucose.: 109 mg/dL (11 Oct 2018 07:17)    No acute change in mental status  Auscultation of the chest reveals equal bs  Abdomen is soft  Extremities are warm and well perfused  Wounds appear clean and unremarkable  Antibiotics are periop    labs  CBC Full  -  ( 11 Oct 2018 03:33 )  WBC Count : 6.4 K/uL  Hemoglobin : 10.0 g/dL  Hematocrit : 30.2 %  Platelet Count - Automated : 190 K/uL  Mean Cell Volume : 93.2 fL  Mean Cell Hemoglobin : 30.9 pg  Mean Cell Hemoglobin Concentration : 33.1 g/dL  Auto Neutrophil # : x  Auto Lymphocyte # : x  Auto Monocyte # : x  Auto Eosinophil # : x  Auto Basophil # : x  Auto Neutrophil % : x  Auto Lymphocyte % : x  Auto Monocyte % : x  Auto Eosinophil % : x  Auto Basophil % : x    10-11    139  |  102  |  14  ----------------------------<  99  3.9   |  30  |  0.71    Ca    8.6      11 Oct 2018 03:33  Phos  2.6     10-11  Mg     2.3     10-11    TPro  6.0  /  Alb  3.0<L>  /  TBili  0.5  /  DBili  x   /  AST  16  /  ALT  6<L>  /  AlkPhos  61  10-11    PT/INR - ( 11 Oct 2018 03:34 )   PT: 11.9 sec;   INR: 1.07          PTT - ( 11 Oct 2018 03:34 )  PTT:35.7 sec  The current medications were reviewed   MEDICATIONS  (STANDING):  aspirin enteric coated 81 milliGRAM(s) Oral daily  atorvastatin 10 milliGRAM(s) Oral at bedtime  chlorhexidine 2% Cloths 1 Application(s) Topical daily  clopidogrel Tablet 75 milliGRAM(s) Oral daily  docusate sodium 100 milliGRAM(s) Oral three times a day  furosemide    Tablet 20 milliGRAM(s) Oral daily  heparin  Injectable 5000 Unit(s) SubCutaneous every 8 hours  pantoprazole    Tablet 40 milliGRAM(s) Oral before breakfast  senna 2 Tablet(s) Oral at bedtime    MEDICATIONS  (PRN):       PROBLEM LIST/ ASSESSMENT:  HEALTH ISSUES - PROBLEM Dx:    s/p TAVR  ,   Patient is a 84y old  Male who presents with a chief complaint of TAVR (11 Oct 2018 09:38)     s/p TAVR      My plan includes :  close hemodynamic, ventilatory and drain monitoring and management per post op routine    Monitor for arrhythmias and monitor parameters for organ perfusion  monitor neurologic status  Head of the bed should remain elevated to 45 deg .   chest PT and IS will be encouraged  monitor adequacy of oxygenation and ventilation and attempt to wean oxygen  Nutritional goals will be met using po eventually , ensure adequate caloric intake and montior the same  Stress ulcer and VTE prophylaxis will be achieved    Glycemic control is satisfactory  Electrolytes have been repleted as necessary and wound care has been carried out. Pain control has been achieved.   agressive physical therapy and early mobility and ambulation goals will be met   The family was updated about the course and plan  CRITICAL CARE TIME SPENT in evaluation and management, reassessments, review and interpretation of labs and x-rays, ventilator and hemodynamic management, formulating a plan and coordinating care: __25___ MIN.  Time does not include procedural time.  CTICU ATTENDING     					    Odilon Chen MD

## 2018-10-11 NOTE — DISCHARGE NOTE ADULT - PLAN OF CARE
s/p TAVR -Please follow up with Dr. Landaverde on .  The office is located at [facility], 4th floor. Call us with any questions #[phone].      -  Please follow up with Dr. Cabrera on     .  The office is located at [facility] [address] [place] [address]. The number is [phone].    -  Please follow up with your cardiologist, Dr. Brant Tapia within 1 week of doing home.    -Walk daily as tolerated and use your incentive spirometer every hour.    -No driving or strenuous activity/exercise for 6 weeks, or until cleared by your surgeon.     -Gently clean your incisions with anti-bacterial soap and water, pat dry.  You may leave them open to air.    -Call your doctor if you have shortness of breath, chest pain not relieved by pain medication, dizziness, fever >101.5, or increased redness or drainage from incisions. -Please follow up with Dr. Landaverde on .  The office is located at [facility], 4th floor. Call us with any questions #[phone].      -  Please follow up with Dr. Cabrera on 10/29/18 @ 11:50.  The office is located at [facility] [address] [place] [address]. The number is [phone].    -  Please follow up with your cardiologist, Dr. Brant Tapia within 1 week of doing home.    -Walk daily as tolerated and use your incentive spirometer every hour.    -No driving or strenuous activity/exercise for 6 weeks, or until cleared by your surgeon.     -Gently clean your incisions with anti-bacterial soap and water, pat dry.  You may leave them open to air.    -Call your doctor if you have shortness of breath, chest pain not relieved by pain medication, dizziness, fever >101.5, or increased redness or drainage from incisions. s/p TAVR (Bettina device) -Please follow up with Dr. Landaverde on 10/22/18 @ 11:30am.  The office is located at [facility], 4th floor. Call us with any questions #[phone].      -  Please follow up with Dr. Cabrera on 10/29/18 @ 11:50am.  The office is located at [address] [place] [address]. The number is [phone].    -  Please follow up with your cardiologist, Dr. Brant Tapia within 1 week of doing home.    -Walk daily as tolerated and use your incentive spirometer every hour.    -No driving or strenuous activity/exercise for 6 weeks, or until cleared by your surgeon.     -Gently clean your incisions with anti-bacterial soap and water, pat dry.  You may leave them open to air.    -Call your doctor if you have shortness of breath, chest pain not relieved by pain medication, dizziness, fever >101.5, or increased redness or drainage from incisions.

## 2018-10-11 NOTE — DISCHARGE NOTE ADULT - CARE PLAN
Principal Discharge DX:	Aortic valve stenosis, etiology of cardiac valve disease unspecified  Goal:	s/p TAVR  Assessment and plan of treatment:	-Please follow up with Dr. Landaverde on .  The office is located at [facility], [facility], 4th floor. Call us with any questions #[phone].      -  Please follow up with Dr. Cabrera on     .  The office is located at [address] [place] [address]. The number is [phone].    -  Please follow up with your cardiologist, Dr. Brant Tapia within 1 week of doing home.    -Walk daily as tolerated and use your incentive spirometer every hour.    -No driving or strenuous activity/exercise for 6 weeks, or until cleared by your surgeon.     -Gently clean your incisions with anti-bacterial soap and water, pat dry.  You may leave them open to air.    -Call your doctor if you have shortness of breath, chest pain not relieved by pain medication, dizziness, fever >101.5, or increased redness or drainage from incisions. Principal Discharge DX:	Aortic valve stenosis, etiology of cardiac valve disease unspecified  Goal:	s/p TAVR  Assessment and plan of treatment:	-Please follow up with Dr. Landaverde on .  The office is located at [facility], 4th floor. Call us with any questions #[phone].      -  Please follow up with Dr. Cabrera on 10/29/18 @ 11:50.  The office is located at [facility] [address] [place] [address]. The number is [phone].    -  Please follow up with your cardiologist, Dr. Brant Tapia within 1 week of doing home.    -Walk daily as tolerated and use your incentive spirometer every hour.    -No driving or strenuous activity/exercise for 6 weeks, or until cleared by your surgeon.     -Gently clean your incisions with anti-bacterial soap and water, pat dry.  You may leave them open to air.    -Call your doctor if you have shortness of breath, chest pain not relieved by pain medication, dizziness, fever >101.5, or increased redness or drainage from incisions. Principal Discharge DX:	Aortic valve stenosis, etiology of cardiac valve disease unspecified  Goal:	s/p TAVR (Bettina device)  Assessment and plan of treatment:	-Please follow up with Dr. Landaverde on 10/22/18 @ 11:30am.  The office is located at [facility], [facility], 4th floor. Call us with any questions #[phone].      -  Please follow up with Dr. Cabrera on 10/29/18 @ 11:50am.  The office is located at [facility] [address] [place] [address]. The number is [phone].    -  Please follow up with your cardiologist, Dr. Brant Tapia within 1 week of doing home.    -Walk daily as tolerated and use your incentive spirometer every hour.    -No driving or strenuous activity/exercise for 6 weeks, or until cleared by your surgeon.     -Gently clean your incisions with anti-bacterial soap and water, pat dry.  You may leave them open to air.    -Call your doctor if you have shortness of breath, chest pain not relieved by pain medication, dizziness, fever >101.5, or increased redness or drainage from incisions.

## 2018-10-11 NOTE — DISCHARGE NOTE ADULT - MEDICATION SUMMARY - MEDICATIONS TO TAKE
I will START or STAY ON the medications listed below when I get home from the hospital:    aspirin 81 mg oral delayed release tablet  -- 1 tab(s) by mouth once a day  -- Indication: For Anti-platelet therapy    Lipitor 10 mg oral tablet  -- 1 tab(s) by mouth once a day  -- Indication: For Hyperlipidemia    clopidogrel 75 mg oral tablet  -- 1 tab(s) by mouth once a day  -- Indication: For Anti-platelet therapy    furosemide 20 mg oral tablet  -- 1 tab(s) by mouth once a day  -- Indication: For Chronic diastolic (congestive) heart failure    potassium chloride 20 mEq oral tablet, extended release  -- 1 tab(s) by mouth once a day  -- Indication: For Chronic diastolic (congestive) heart failure    Fish Oil oral capsule  -- 1 cap(s) by mouth once a day  -- Indication: For Supplement    Glucosamine Chondroitin oral capsule  -- 3 cap(s) by mouth once a day  -- Indication: For Supplement    Vitamin B-100 oral tablet  -- 1 tab(s) by mouth once a day  -- Indication: For VItamin    Vitamin C 500 mg oral tablet  -- 1 tab(s) by mouth once a day  -- Indication: For VItamin    Vitamin D3 1000 intl units oral capsule  -- 1 cap(s) by mouth once a day  -- Indication: For VItamin

## 2018-10-11 NOTE — PROGRESS NOTE ADULT - ASSESSMENT
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  Patient recently admitted with decompensated heart failure and was referred to Dr. Landaverde for further evaluation of valvular disease.  Patient c/o increased fatigue x 1 year.  Patient recently went to [place] and would become light headed while walking on incline.  Denies SOB with exertion.  Patient also noticed significant lower extremity edema.  Patient was referred to [facility] and found to be in AF with RVR.  Patient had an echo during that admission showing severe aortic stenosis.  He was also found to be anemic with Hgb 8.6 and underwent hematology evaluation including bone marrow biopsy which diagnosed him with Waldenstrom's macroglobulinemia and monoclonal gammopathy.  He was worked up for a TAVR with Dr. Landaverde and found to be a candidate for a transfemoral TAVR.  Patient was admitted to the hospital as a same day admission on 10/9/18. Now PD 2 from procedure.    Neuro: AA&O x3  -  cont. current pain regimen  -  Tylenol PRN    Cardiovascular: transient LBBB with bradycardia  -  hold atenolol at this time  -  ASA/plavix for TAVR  -  Statin: will resume home medication upon going home    Pulm: 98% on RA  -  Encourage IS 10x/hr while awake  -  Ambulate multiple times daily    GI: tolerating diet  -  PPx: pantoprazole 40 daily  -  Heart healthy diet    Heme: H/H stable  -  Cont. to trend CBC  -  Heparin 5000q8 PRN  -  Waldenstrom's macroglobulinemia and monoclonal gammopathy: will follow up with hematologist as an outpatient    Renal: Crt. 0.71  -  Cont. to trend BUN/Crt.    Dispo:  - home today with wife

## 2018-10-11 NOTE — DISCHARGE NOTE ADULT - PATIENT PORTAL LINK FT
You can access the "[employer] Patient Portal, offered by [facility], by registering with the following website: [email or web address]

## 2018-10-11 NOTE — DISCHARGE NOTE ADULT - HOSPITAL COURSE
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  Patient recently admitted with decompensated heart failure and was referred to Dr. Landaverde for further evaluation of valvular disease.  Patient c/o increased fatigue x 1 year.  Patient recently went to [place] and would become light headed while walking on incline.  Denies SOB with exertion.  Patient also noticed significant lower extremity edema.  Patient was referred to [facility] and found to be in AF with RVR.  Patient had an echo during that admission showing severe aortic stenosis.  He was also found to be anemic with Hgb 8.6 and underwent hematology evaluation including bone marrow biopsy which diagnosed him with Waldenstrom's macroglobulinemia and monoclonal gammopathy.  He was worked up for a TAVR with Dr. Landaverde and found to be a candidate for a transfemoral TAVR.  Patient was admitted to the hospital as a same day admission on 10/9/18.  He went to the OR with Dr. Landaverde and had a Sapient device placed.  During the procedure, he developed a LBBB with bradycardia.  He narrowed afterwards and was evaluated by EP who he will follow up with as an outpatient.  On POD 1, he was delined and ambulated the hallways, On POD 2, he is stable for home.

## 2018-10-13 DIAGNOSIS — I35.0 NONRHEUMATIC AORTIC (VALVE) STENOSIS: ICD-10-CM

## 2018-10-13 DIAGNOSIS — D64.9 ANEMIA, UNSPECIFIED: ICD-10-CM

## 2018-10-13 DIAGNOSIS — E78.5 HYPERLIPIDEMIA, UNSPECIFIED: ICD-10-CM

## 2018-10-13 DIAGNOSIS — I44.7 LEFT BUNDLE-BRANCH BLOCK, UNSPECIFIED: ICD-10-CM

## 2018-10-13 DIAGNOSIS — I50.32 CHRONIC DIASTOLIC (CONGESTIVE) HEART FAILURE: ICD-10-CM

## 2018-10-13 DIAGNOSIS — I11.0 HYPERTENSIVE HEART DISEASE WITH HEART FAILURE: ICD-10-CM

## 2018-10-13 DIAGNOSIS — I48.0 PAROXYSMAL ATRIAL FIBRILLATION: ICD-10-CM

## 2018-10-22 ENCOUNTER — NON-APPOINTMENT (OUTPATIENT)
Age: 83
End: 2018-10-22

## 2018-10-22 ENCOUNTER — APPOINTMENT (OUTPATIENT)
Dept: CARDIOTHORACIC SURGERY | Facility: CLINIC | Age: 83
End: 2018-10-22
Payer: MEDICARE

## 2018-10-22 VITALS
DIASTOLIC BLOOD PRESSURE: 65 MMHG | OXYGEN SATURATION: 98 % | RESPIRATION RATE: 18 BRPM | BODY MASS INDEX: 17.19 KG/M2 | HEART RATE: 65 BPM | WEIGHT: 113 LBS | TEMPERATURE: 97.2 F | SYSTOLIC BLOOD PRESSURE: 142 MMHG

## 2018-10-22 PROCEDURE — 93000 ELECTROCARDIOGRAM COMPLETE: CPT

## 2018-10-22 PROCEDURE — 99214 OFFICE O/P EST MOD 30 MIN: CPT

## 2018-10-29 ENCOUNTER — APPOINTMENT (OUTPATIENT)
Dept: HEART AND VASCULAR | Facility: CLINIC | Age: 83
End: 2018-10-29
Payer: MEDICARE

## 2018-10-29 VITALS
HEIGHT: 68 IN | DIASTOLIC BLOOD PRESSURE: 60 MMHG | WEIGHT: 107 LBS | SYSTOLIC BLOOD PRESSURE: 125 MMHG | BODY MASS INDEX: 16.22 KG/M2 | HEART RATE: 71 BPM

## 2018-10-29 PROCEDURE — 99215 OFFICE O/P EST HI 40 MIN: CPT | Mod: 25

## 2018-10-29 PROCEDURE — 93000 ELECTROCARDIOGRAM COMPLETE: CPT

## 2018-10-31 ENCOUNTER — APPOINTMENT (OUTPATIENT)
Dept: CARDIOLOGY | Facility: CLINIC | Age: 83
End: 2018-10-31

## 2018-10-31 VITALS
OXYGEN SATURATION: 95 % | BODY MASS INDEX: 16.06 KG/M2 | SYSTOLIC BLOOD PRESSURE: 148 MMHG | TEMPERATURE: 98 F | HEART RATE: 76 BPM | WEIGHT: 106 LBS | HEIGHT: 68 IN | DIASTOLIC BLOOD PRESSURE: 58 MMHG

## 2018-11-04 ENCOUNTER — APPOINTMENT (OUTPATIENT)
Dept: HEART AND VASCULAR | Facility: CLINIC | Age: 83
End: 2018-11-04
Payer: MEDICARE

## 2018-11-04 PROCEDURE — 93228 REMOTE 30 DAY ECG REV/REPORT: CPT

## 2018-11-11 PROCEDURE — 71045 X-RAY EXAM CHEST 1 VIEW: CPT

## 2018-11-11 PROCEDURE — 82330 ASSAY OF CALCIUM: CPT

## 2018-11-11 PROCEDURE — 84132 ASSAY OF SERUM POTASSIUM: CPT

## 2018-11-11 PROCEDURE — 86900 BLOOD TYPING SEROLOGIC ABO: CPT

## 2018-11-11 PROCEDURE — 80053 COMPREHEN METABOLIC PANEL: CPT

## 2018-11-11 PROCEDURE — 82962 GLUCOSE BLOOD TEST: CPT

## 2018-11-11 PROCEDURE — 93306 TTE W/DOPPLER COMPLETE: CPT

## 2018-11-11 PROCEDURE — 85025 COMPLETE CBC W/AUTO DIFF WBC: CPT

## 2018-11-11 PROCEDURE — 85730 THROMBOPLASTIN TIME PARTIAL: CPT

## 2018-11-11 PROCEDURE — 83880 ASSAY OF NATRIURETIC PEPTIDE: CPT

## 2018-11-11 PROCEDURE — 93005 ELECTROCARDIOGRAM TRACING: CPT

## 2018-11-11 PROCEDURE — C1889: CPT

## 2018-11-11 PROCEDURE — 86850 RBC ANTIBODY SCREEN: CPT

## 2018-11-11 PROCEDURE — 85610 PROTHROMBIN TIME: CPT

## 2018-11-11 PROCEDURE — 86901 BLOOD TYPING SEROLOGIC RH(D): CPT

## 2018-11-11 PROCEDURE — 86923 COMPATIBILITY TEST ELECTRIC: CPT

## 2018-11-11 PROCEDURE — 83605 ASSAY OF LACTIC ACID: CPT

## 2018-11-11 PROCEDURE — C1887: CPT

## 2018-11-11 PROCEDURE — 36415 COLL VENOUS BLD VENIPUNCTURE: CPT

## 2018-11-11 PROCEDURE — C1894: CPT

## 2018-11-11 PROCEDURE — 85027 COMPLETE CBC AUTOMATED: CPT

## 2018-11-11 PROCEDURE — C1769: CPT

## 2018-11-11 PROCEDURE — 82803 BLOOD GASES ANY COMBINATION: CPT

## 2018-11-11 PROCEDURE — 83735 ASSAY OF MAGNESIUM: CPT

## 2018-11-11 PROCEDURE — 84100 ASSAY OF PHOSPHORUS: CPT

## 2018-11-11 PROCEDURE — C1760: CPT

## 2018-11-11 PROCEDURE — 84295 ASSAY OF SERUM SODIUM: CPT

## 2018-11-11 PROCEDURE — 93321 DOPPLER ECHO F-UP/LMTD STD: CPT

## 2018-11-19 ENCOUNTER — APPOINTMENT (OUTPATIENT)
Dept: CARDIOTHORACIC SURGERY | Facility: CLINIC | Age: 83
End: 2018-11-19
Payer: MEDICARE

## 2018-11-19 ENCOUNTER — OUTPATIENT (OUTPATIENT)
Dept: OUTPATIENT SERVICES | Facility: HOSPITAL | Age: 83
LOS: 1 days | End: 2018-11-19
Payer: MEDICARE

## 2018-11-19 VITALS
TEMPERATURE: 97.2 F | BODY MASS INDEX: 17.18 KG/M2 | WEIGHT: 113 LBS | DIASTOLIC BLOOD PRESSURE: 63 MMHG | OXYGEN SATURATION: 98 % | RESPIRATION RATE: 17 BRPM | SYSTOLIC BLOOD PRESSURE: 141 MMHG | HEART RATE: 53 BPM

## 2018-11-19 DIAGNOSIS — I35.0 NONRHEUMATIC AORTIC (VALVE) STENOSIS: ICD-10-CM

## 2018-11-19 DIAGNOSIS — Z98.890 OTHER SPECIFIED POSTPROCEDURAL STATES: Chronic | ICD-10-CM

## 2018-11-19 DIAGNOSIS — H26.9 UNSPECIFIED CATARACT: Chronic | ICD-10-CM

## 2018-11-19 LAB
ALBUMIN SERPL ELPH-MCNC: 3.7 G/DL — SIGNIFICANT CHANGE UP (ref 3.3–5)
ALP SERPL-CCNC: 60 U/L — SIGNIFICANT CHANGE UP (ref 40–120)
ALT FLD-CCNC: 15 U/L — SIGNIFICANT CHANGE UP (ref 10–45)
ANION GAP SERPL CALC-SCNC: 8 MMOL/L — SIGNIFICANT CHANGE UP (ref 5–17)
AST SERPL-CCNC: 18 U/L — SIGNIFICANT CHANGE UP (ref 10–40)
BASOPHILS NFR BLD AUTO: 0.3 % — SIGNIFICANT CHANGE UP (ref 0–2)
BILIRUB DIRECT SERPL-MCNC: <0.2 MG/DL — SIGNIFICANT CHANGE UP (ref 0–0.2)
BILIRUB SERPL-MCNC: 0.9 MG/DL — SIGNIFICANT CHANGE UP (ref 0.2–1.2)
BUN SERPL-MCNC: 19 MG/DL — SIGNIFICANT CHANGE UP (ref 7–23)
CALCIUM SERPL-MCNC: 9.4 MG/DL — SIGNIFICANT CHANGE UP (ref 8.4–10.5)
CHLORIDE SERPL-SCNC: 105 MMOL/L — SIGNIFICANT CHANGE UP (ref 96–108)
CO2 SERPL-SCNC: 29 MMOL/L — SIGNIFICANT CHANGE UP (ref 22–31)
CREAT SERPL-MCNC: 0.9 MG/DL — SIGNIFICANT CHANGE UP (ref 0.5–1.3)
EOSINOPHIL NFR BLD AUTO: 6.6 % — HIGH (ref 0–6)
GLUCOSE SERPL-MCNC: 84 MG/DL — SIGNIFICANT CHANGE UP (ref 70–99)
HCT VFR BLD CALC: 35.9 % — LOW (ref 39–50)
HGB BLD-MCNC: 11.4 G/DL — LOW (ref 13–17)
LYMPHOCYTES # BLD AUTO: 32.7 % — SIGNIFICANT CHANGE UP (ref 13–44)
MCHC RBC-ENTMCNC: 30.1 PG — SIGNIFICANT CHANGE UP (ref 27–34)
MCHC RBC-ENTMCNC: 31.8 G/DL — LOW (ref 32–36)
MCV RBC AUTO: 94.7 FL — SIGNIFICANT CHANGE UP (ref 80–100)
MONOCYTES NFR BLD AUTO: 7.7 % — SIGNIFICANT CHANGE UP (ref 2–14)
NEUTROPHILS NFR BLD AUTO: 52.7 % — SIGNIFICANT CHANGE UP (ref 43–77)
PLATELET # BLD AUTO: 248 K/UL — SIGNIFICANT CHANGE UP (ref 150–400)
POTASSIUM SERPL-MCNC: 4.3 MMOL/L — SIGNIFICANT CHANGE UP (ref 3.5–5.3)
POTASSIUM SERPL-SCNC: 4.3 MMOL/L — SIGNIFICANT CHANGE UP (ref 3.5–5.3)
PROT SERPL-MCNC: 6.8 G/DL — SIGNIFICANT CHANGE UP (ref 6–8.3)
RBC # BLD: 3.79 M/UL — LOW (ref 4.2–5.8)
RBC # FLD: 14.4 % — SIGNIFICANT CHANGE UP (ref 10.3–16.9)
SODIUM SERPL-SCNC: 142 MMOL/L — SIGNIFICANT CHANGE UP (ref 135–145)
WBC # BLD: 6.1 K/UL — SIGNIFICANT CHANGE UP (ref 3.8–10.5)
WBC # FLD AUTO: 6.1 K/UL — SIGNIFICANT CHANGE UP (ref 3.8–10.5)

## 2018-11-19 PROCEDURE — 99214 OFFICE O/P EST MOD 30 MIN: CPT

## 2018-11-19 PROCEDURE — 85025 COMPLETE CBC W/AUTO DIFF WBC: CPT

## 2018-11-19 PROCEDURE — 36415 COLL VENOUS BLD VENIPUNCTURE: CPT

## 2018-11-19 PROCEDURE — 82248 BILIRUBIN DIRECT: CPT

## 2018-11-19 PROCEDURE — 80053 COMPREHEN METABOLIC PANEL: CPT

## 2018-11-19 NOTE — HISTORY OF PRESENT ILLNESS
[FreeTextEntry1] : 84 year old male with a history of hypertension, hyperlipidemia, anemia secondary to Waldenstrom's macroglobulinemia and monoclonal gammopathy (being monitored by hematology), paroxysmal atrial fibrillation/flutter (not on anticoagulation) and chronic diastolic heart failure with severe aortic stenosis with a recent admission for decompensated heart failure s/p TF TAVR on 10/9/18 using S3 (26 mm, commercial) who presents for his one month follow up. \par \par The patient states he is feeling great. He denies any chest pain or shortness of breath, at rest and with exertion. He has returned to walking daily with inclines and has no symptoms. The patient also denies orthopnea, PND, palpitations, dizziness, or syncope. He has had some minor skin bleeding while as ASA/Plavix and Dr. Tapia instructed him to discontinue to Plavix. \par \par The patient wore an event monitor for two weeks; he had episodes of atrial fibrillation with HR into the 110s. Due to his episodes of bradycardia, the patient cannot tolerate any rate control medications.

## 2018-11-19 NOTE — PHYSICAL EXAM
[Normal Appearance] : normal appearance [General Appearance - In No Acute Distress] : no acute distress [Normal Conjunctiva] : the conjunctiva exhibited no abnormalities [Normal Oral Mucosa] : normal oral mucosa [Normal Jugular Venous A Waves Present] : normal jugular venous A waves present [Normal Jugular Venous V Waves Present] : normal jugular venous V waves present [] : no respiratory distress [Respiration, Rhythm And Depth] : normal respiratory rhythm and effort [Auscultation Breath Sounds / Voice Sounds] : lungs were clear to auscultation bilaterally [Heart Rate And Rhythm] : heart rate and rhythm were normal [Edema] : no peripheral edema present [Bowel Sounds] : normal bowel sounds [Abdomen Soft] : soft [Abdomen Tenderness] : non-tender [Abnormal Walk] : normal gait [Nail Clubbing] : no clubbing of the fingernails [Cyanosis, Localized] : no localized cyanosis [Skin Color & Pigmentation] : normal skin color and pigmentation [FreeTextEntry1] : bilateral groins, no hematoma, no bruit, pulses intact to baseline.

## 2018-11-19 NOTE — REVIEW OF SYSTEMS
[Feeling Fatigued] : feeling fatigued [Dyspnea on exertion] : dyspnea during exertion [Lower Ext Edema] : lower extremity edema [Dizziness] : dizziness [Negative] : Psychiatric [Fever] : no fever [Headache] : no headache [Chills] : no chills [Shortness Of Breath] : no shortness of breath [Chest  Pressure] : no chest pressure [Chest Pain] : no chest pain [Leg Claudication] : no intermittent leg claudication [Palpitations] : no palpitations [Tremor] : no tremor was seen [Numbness (Hypesthesia)] : no numbness [Convulsions] : no convulsions [Tingling (Paresthesia)] : no tingling

## 2018-11-21 ENCOUNTER — APPOINTMENT (OUTPATIENT)
Dept: CARDIOTHORACIC SURGERY | Facility: CLINIC | Age: 83
End: 2018-11-21

## 2018-11-27 ENCOUNTER — RX RENEWAL (OUTPATIENT)
Age: 83
End: 2018-11-27

## 2018-11-28 ENCOUNTER — OUTPATIENT (OUTPATIENT)
Dept: OUTPATIENT SERVICES | Facility: HOSPITAL | Age: 83
LOS: 1 days | Discharge: HOME | End: 2018-11-28
Payer: MEDICARE

## 2018-11-28 VITALS — WEIGHT: 110.01 LBS | HEIGHT: 68 IN

## 2018-11-28 DIAGNOSIS — I49.5 SICK SINUS SYNDROME: ICD-10-CM

## 2018-11-28 DIAGNOSIS — H26.9 UNSPECIFIED CATARACT: Chronic | ICD-10-CM

## 2018-11-28 DIAGNOSIS — Z98.890 OTHER SPECIFIED POSTPROCEDURAL STATES: Chronic | ICD-10-CM

## 2018-11-28 DIAGNOSIS — I10 ESSENTIAL (PRIMARY) HYPERTENSION: ICD-10-CM

## 2018-11-28 DIAGNOSIS — I48.0 PAROXYSMAL ATRIAL FIBRILLATION: ICD-10-CM

## 2018-11-28 PROCEDURE — 33208 INSRT HEART PM ATRIAL & VENT: CPT | Mod: KX

## 2018-11-28 RX ORDER — CEFAZOLIN SODIUM 1 G
VIAL (EA) INJECTION
Qty: 0 | Refills: 0 | Status: COMPLETED | OUTPATIENT
Start: 2018-11-28 | End: 2018-11-29

## 2018-11-28 RX ORDER — CEFAZOLIN SODIUM 1 G
1000 VIAL (EA) INJECTION EVERY 8 HOURS
Qty: 0 | Refills: 0 | Status: COMPLETED | OUTPATIENT
Start: 2018-11-28 | End: 2018-11-29

## 2018-11-28 RX ORDER — ASPIRIN/CALCIUM CARB/MAGNESIUM 324 MG
81 TABLET ORAL DAILY
Qty: 0 | Refills: 0 | Status: DISCONTINUED | OUTPATIENT
Start: 2018-11-28 | End: 2018-11-29

## 2018-11-28 RX ORDER — ATORVASTATIN CALCIUM 80 MG/1
10 TABLET, FILM COATED ORAL DAILY
Qty: 0 | Refills: 0 | Status: DISCONTINUED | OUTPATIENT
Start: 2018-11-28 | End: 2018-11-29

## 2018-11-28 RX ORDER — CEFAZOLIN SODIUM 1 G
1000 VIAL (EA) INJECTION ONCE
Qty: 0 | Refills: 0 | Status: COMPLETED | OUTPATIENT
Start: 2018-11-28 | End: 2018-11-28

## 2018-11-28 RX ADMIN — Medication 100 MILLIGRAM(S): at 15:05

## 2018-11-28 RX ADMIN — Medication 100 MILLIGRAM(S): at 22:04

## 2018-11-28 NOTE — H&P ADULT - HISTORY OF PRESENT ILLNESS
83 y/o male with hx HTN, HLD, Anemia 2nd to Waldenstrom's macroglobulinemia and monoclonal gammopathy (heme following), paroxysmal atrial fibrillation/flutter (not on AC yet), and chronic diastolic heart failure with severe aortic stenosis. He is now s/p TAVR (qamar) on 10/9/18.  Post procedure he developed transient LBBB.  EKG on 10/9/18 showed LBBB with  ms.    Patient presents today for PPM implant , secondary to his tachybrady syndrome and need for beta blocker therapy fo A.Fib for RVR. Patient is not on anticoagulation , will start after implant. Atenolol was held at hospital discharge.

## 2018-11-28 NOTE — PATIENT PROFILE ADULT - FALL HARM RISK
surgery/S/P PPM placement today in L ACW - C/D/I dressing, no s/s infection, or c/o pain. Pt instructed to keep left arm below shoulder level. Pt verbalized understanding.

## 2018-11-28 NOTE — H&P ADULT - ASSESSMENT
85 y/o male with hx HTN, HLD, Anemia 2nd to Waldenstrom's macroglobulinemia and monoclonal gammopathy (heme following), paroxysmal atrial fibrillation/flutter (not on AC yet), and chronic diastolic heart failure with severe aortic stenosis. He is now s/p TAVR (qamar) on 10/9/18.   Patient presents today for PPM implant , secondary to his tachybrady syndrome and need for beta blocker therapy fo A.Fib for RVR

## 2018-11-29 ENCOUNTER — TRANSCRIPTION ENCOUNTER (OUTPATIENT)
Age: 83
End: 2018-11-29

## 2018-11-29 VITALS — TEMPERATURE: 99 F

## 2018-11-29 PROCEDURE — 33208 INSRT HEART PM ATRIAL & VENT: CPT | Mod: KX

## 2018-11-29 PROCEDURE — C1898: CPT

## 2018-11-29 PROCEDURE — C1713: CPT

## 2018-11-29 PROCEDURE — 71046 X-RAY EXAM CHEST 2 VIEWS: CPT

## 2018-11-29 PROCEDURE — 71046 X-RAY EXAM CHEST 2 VIEWS: CPT | Mod: 26

## 2018-11-29 PROCEDURE — C1894: CPT

## 2018-11-29 PROCEDURE — C1785: CPT

## 2018-11-29 RX ORDER — ATENOLOL 25 MG/1
1 TABLET ORAL
Qty: 0 | Refills: 0 | COMMUNITY

## 2018-11-29 RX ADMIN — Medication 81 MILLIGRAM(S): at 11:58

## 2018-11-29 RX ADMIN — Medication 100 MILLIGRAM(S): at 06:44

## 2018-11-29 NOTE — PROGRESS NOTE ADULT - SUBJECTIVE AND OBJECTIVE BOX
EPS Device interrogation    Indication: post implant    Device model: 	LAUREN Moreno 			Implanting Physician: Dr. Baker    Functioning Mode: AAI <=>DDD			    Underlying Rhythm: AS/VS    Pacemaker dependency:  No    Battery status: 100% (BRITTANI)  Interrogating parameters:   				RA			RV			LV    Sense:                                           2.0  mV                       6.3  mV    Threshold:                                      0.25 V @ 0.4 ms      0.5 V@ 1.0 ms (HIS pacing)    Pacing Impedance:                                 475om                   437  om    Shock Impedance:                                                n/a    Events/Alert:  none    Parameter change: 	none    Normal function PPM   site of implant dry/clean, no bleeding, no swelling, no hematoma    [ ]EPS attending: Interrogation reviewed. Agree with above.

## 2018-11-29 NOTE — DISCHARGE NOTE ADULT - CARE PROVIDER_API CALL
Rosendo Cabrera), Cardiac Electrophysiology; Cardiovascular Disease; Internal Medicine  [address] [place] [address]  Phone: [phone]  Fax: [phone]

## 2018-11-29 NOTE — DISCHARGE NOTE ADULT - MEDICATION SUMMARY - MEDICATIONS TO TAKE
I will START or STAY ON the medications listed below when I get home from the hospital:    aspirin 81 mg oral delayed release tablet  -- 1 tab(s) by mouth once a day  -- Indication: For Home med    Lipitor 10 mg oral tablet  -- 1 tab(s) by mouth once a day  -- Indication: For HLD    Fish Oil oral capsule  -- 1 cap(s) by mouth once a day  -- Indication: For Home med    Glucosamine Chondroitin oral capsule  -- 3 cap(s) by mouth once a day  -- Indication: For Home med    Vitamin B-100 oral tablet  -- 1 tab(s) by mouth once a day  -- Indication: For Home med    Vitamin C 500 mg oral tablet  -- 1 tab(s) by mouth once a day  -- Indication: For Home med    Vitamin D3 1000 intl units oral capsule  -- 1 cap(s) by mouth once a day  -- Indication: For Home med I will START or STAY ON the medications listed below when I get home from the hospital:    aspirin 81 mg oral delayed release tablet  -- 1 tab(s) by mouth once a day  -- Indication: For Home med    Lipitor 10 mg oral tablet  -- 1 tab(s) by mouth once a day  -- Indication: For HLD    atenolol 25 mg oral tablet  -- 1 tab(s) by mouth once a day  -- Indication: For Paroxysmal atrial fibrillation    Fish Oil oral capsule  -- 1 cap(s) by mouth once a day  -- Indication: For Home med    Glucosamine Chondroitin oral capsule  -- 3 cap(s) by mouth once a day  -- Indication: For Home med    Vitamin B-100 oral tablet  -- 1 tab(s) by mouth once a day  -- Indication: For Home med    Vitamin C 500 mg oral tablet  -- 1 tab(s) by mouth once a day  -- Indication: For Home med    Vitamin D3 1000 intl units oral capsule  -- 1 cap(s) by mouth once a day  -- Indication: For Home med

## 2018-11-29 NOTE — DISCHARGE NOTE ADULT - PLAN OF CARE
s/p PPM implant Resume your regular diet.  Avoid heavy lifting, exercise and strenuous activity for 4 weeks  follow up  at [facility], call to schedule an appointment [phone]  If you have any questions call [phone] Resume your regular diet.  Avoid heavy lifting, exercise and strenuous activity for 4 weeks  follow up  at [facility], call to schedule an appointment [phone]  Resume Atenolol 25 mg daily  If you have any questions call [phone]

## 2018-11-29 NOTE — DISCHARGE NOTE ADULT - ADDITIONAL INSTRUCTIONS
Resume your regular diet.  Avoid heavy lifting, exercise and strenuous activity for 4 weeks  follow up  at [facility], call to schedule an appointment [phone]  If you have any questions call [phone] Resume your regular diet.  Avoid heavy lifting, exercise and strenuous activity for 4 weeks  follow up  at [facility], call to schedule an appointment [phone]  Resume Atenolol 25 mg daily  If you have any questions call [phone]

## 2018-11-29 NOTE — DISCHARGE NOTE ADULT - CARE PLAN
Principal Discharge DX:	Tachy-rachana syndrome  Goal:	s/p PPM implant  Assessment and plan of treatment:	Resume your regular diet.  Avoid heavy lifting, exercise and strenuous activity for 4 weeks  follow up  at [facility], call to schedule an appointment [phone]  If you have any questions call [phone] Principal Discharge DX:	Tachy-rachana syndrome  Goal:	s/p PPM implant  Assessment and plan of treatment:	Resume your regular diet.  Avoid heavy lifting, exercise and strenuous activity for 4 weeks  follow up  at [facility], call to schedule an appointment [phone]  Resume Atenolol 25 mg daily  If you have any questions call [phone]

## 2018-11-29 NOTE — DISCHARGE NOTE ADULT - PATIENT PORTAL LINK FT
You can access the [facility], offered by [facility], by registering with the following website: [email or web address]

## 2018-11-29 NOTE — DISCHARGE NOTE ADULT - NS AS ACTIVITY OBS
Stairs allowed/Walking-Indoors allowed/No Heavy lifting/straining/Showering allowed/Walking-Outdoors allowed

## 2018-11-29 NOTE — DISCHARGE NOTE ADULT - HOSPITAL COURSE
83 y/o male with hx HTN, HLD, Anemia 2nd to Waldenstrom's macroglobulinemia and monoclonal gammopathy (heme following), paroxysmal atrial fibrillation/flutter (not on AC yet), and chronic diastolic heart failure with severe aortic stenosis. He is now s/p TAVR (qamar) on 10/9/18.  Post procedure he developed transient LBBB.  EKG on 10/9/18 showed LBBB with  ms.    Patient presents today for PPM implant , secondary to his tachybrady syndrome and need for beta blocker therapy fo A.Fib for RVR. Patient is not on anticoagulation , will start after implant. Atenolol was held at hospital discharge.    Patient had successful implantation of dual lead PPM (HIS pacing), there were no complications.  PPM check normal function, CXR done... 85 y/o male with hx HTN, HLD, Anemia 2nd to Waldenstrom's macroglobulinemia and monoclonal gammopathy (heme following), paroxysmal atrial fibrillation/flutter (not on AC yet), and chronic diastolic heart failure with severe aortic stenosis. He is now s/p TAVR (qamar) on 10/9/18.  Post procedure he developed transient LBBB.  EKG on 10/9/18 showed LBBB with  ms.    Patient presents today for PPM implant , secondary to his tachybrady syndrome and need for beta blocker therapy fo A.Fib for RVR. Patient is not on anticoagulation , will start after implant. Atenolol was held at hospital discharge.    Patient had successful implantation of dual lead PPM (HIS pacing), there were no complications.  PPM check normal function, CXR done, no pneumothorax as per radiologist.   Patient was stable for discharge

## 2018-11-30 ENCOUNTER — RX RENEWAL (OUTPATIENT)
Age: 83
End: 2018-11-30

## 2018-12-21 ENCOUNTER — APPOINTMENT (OUTPATIENT)
Dept: HEART AND VASCULAR | Facility: CLINIC | Age: 83
End: 2018-12-21
Payer: MEDICARE

## 2018-12-21 VITALS
HEART RATE: 64 BPM | HEIGHT: 68 IN | SYSTOLIC BLOOD PRESSURE: 156 MMHG | DIASTOLIC BLOOD PRESSURE: 72 MMHG | RESPIRATION RATE: 16 BRPM | WEIGHT: 110 LBS | BODY MASS INDEX: 16.67 KG/M2

## 2018-12-21 PROCEDURE — 93280 PM DEVICE PROGR EVAL DUAL: CPT

## 2018-12-21 PROCEDURE — 99024 POSTOP FOLLOW-UP VISIT: CPT

## 2018-12-21 RX ORDER — METHYLPREDNISOLONE 4 MG/1
4 TABLET ORAL
Refills: 0 | Status: DISCONTINUED | COMMUNITY
End: 2018-12-21

## 2019-01-06 NOTE — PHYSICAL EXAM
[General Appearance - Well Developed] : well developed [Normal Appearance] : normal appearance [Well Groomed] : well groomed [General Appearance - Well Nourished] : well nourished [No Deformities] : no deformities [General Appearance - In No Acute Distress] : no acute distress [Heart Rate And Rhythm] : heart rate and rhythm were normal [Heart Sounds] : normal S1 and S2 [Murmurs] : no murmurs present [Respiration, Rhythm And Depth] : normal respiratory rhythm and effort [Exaggerated Use Of Accessory Muscles For Inspiration] : no accessory muscle use [Auscultation Breath Sounds / Voice Sounds] : lungs were clear to auscultation bilaterally [Left Infraclavicular] : left infraclavicular area [Clean] : clean [Dry] : dry [Healing Well] : healing well [Abdomen Soft] : soft [Abdomen Tenderness] : non-tender [Abdomen Mass (___ Cm)] : no abdominal mass palpated [Nail Clubbing] : no clubbing of the fingernails [Cyanosis, Localized] : no localized cyanosis [Petechial Hemorrhages (___cm)] : no petechial hemorrhages [] : no ischemic changes

## 2019-01-06 NOTE — HISTORY OF PRESENT ILLNESS
[de-identified] : Mr. Pascual is an 84 year-old male with a history of hypertension, HLD, pAF/flutter, and severe aortic stenosis who underwent TAVR in December 2018.  Due to resultant LBBB and significant 1st degree AVB, there was concern over progression to complete heart block.  As such, he underwent dual chamber pacemaker implantation prior to discharge and presents today for follow-up.  He denies any discomfort over the device site.  In addition, he denies any chest pain, SOB, MCCLELLAN, palpitations, dizziness, LE edema, orthopnea, PND, and syncope.  He reports his exercise tolerance has improved since discharge.

## 2019-01-06 NOTE — DISCUSSION/SUMMARY
[FreeTextEntry1] : Mr. Ko is an 84 year-old male with severe AS s/p TAVR with resultant LBBB/1st degree AVB who underwent dual chamber pacemaker implantation.  His device is functioning well and his site is healing-well.  I have asked him to return in 6 months for routine device check.

## 2019-01-06 NOTE — PROCEDURE
[No] : not [Pacemaker] : pacemaker [Threshold Testing Performed] : Threshold testing was performed [Lead Imp:  ___ohms] : lead impedance was [unfilled] ohms [Sensing Amplitude ___mv] : sensing amplitude was [unfilled] mv [___V @] : [unfilled] V [___ ms] : [unfilled] ms [de-identified] : [employer] [de-identified] : AAI-DDD [de-identified] : BRITTANI [de-identified] : Ap and  <1%

## 2019-01-07 ENCOUNTER — APPOINTMENT (OUTPATIENT)
Dept: CARDIOLOGY | Facility: CLINIC | Age: 84
End: 2019-01-07
Payer: MEDICARE

## 2019-01-07 VITALS
HEART RATE: 60 BPM | BODY MASS INDEX: 17.33 KG/M2 | DIASTOLIC BLOOD PRESSURE: 58 MMHG | WEIGHT: 114 LBS | OXYGEN SATURATION: 98 % | SYSTOLIC BLOOD PRESSURE: 158 MMHG

## 2019-01-07 DIAGNOSIS — Z86.79 PERSONAL HISTORY OF OTHER DISEASES OF THE CIRCULATORY SYSTEM: ICD-10-CM

## 2019-01-07 PROCEDURE — 99214 OFFICE O/P EST MOD 30 MIN: CPT

## 2019-01-07 PROCEDURE — 93000 ELECTROCARDIOGRAM COMPLETE: CPT

## 2019-01-11 ENCOUNTER — NON-APPOINTMENT (OUTPATIENT)
Age: 84
End: 2019-01-11

## 2019-01-12 PROBLEM — Z86.79 HISTORY OF CORONARY ATHEROSCLEROSIS: Status: RESOLVED | Noted: 2018-11-04 | Resolved: 2019-01-12

## 2019-01-12 PROBLEM — Z86.79 HISTORY OF ATRIAL FIBRILLATION: Status: RESOLVED | Noted: 2018-07-26 | Resolved: 2019-01-12

## 2019-01-12 NOTE — DISCUSSION/SUMMARY
[FreeTextEntry1] : Valvular heart disease\par The 7/18 echocardiogram demonstrated severe aortic stenosis with a peak gradient of 84 mmHg mean gradient of 58 mmHg and aortic valve area of 0.7 cm². . In 10/18 transcatheter aortic valve replacement (TAVR) was successfully performed by Dr. Landaverde. The postoperative course was complicated by transient left bundle branch block .An 11/18 echocardiogram demonstrated normal functioning of the aortic valve bioprosthesis. Mild mitral deficiency was noted. The pulmonary artery systolic pressure was 32mm Hg The left ventricular ejection fraction was greater than 55%. The present cardiac examination is consistent with normal functioning of the aortic valve bioprosthesis and  the absence of heart failure. There has been significant symptomatic improvement following  aortic  valve replacement.\par \par I have recommended the following:\par 1.No further cardiac testing for this problem at this time\par 2 antibiotic prophylaxis for appropriate dental and surgical procedures\par \par \par \par \par Atrial flutter/fibrillation\par In 7/18 Ezra presented with atrial fibrillation ventricular rate 130/minute. Sinus rhythm was restored spontaneously. On  8/02/18  he was found to be in atrial flutter with a ventricular rate of 132/minute. . 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 Factor X A. inhibitor therapy is indicated. The dose of atenolol may be increased if required to control ventricular rates in atrial fibrillation/flutter.\par \par I have recommended the following:\par 1. Continue the present medical regimen\par 2. No further cardiac testing for this problem at this time\par \par \par Hyperlipidemia\par Hyperlipidemia represents a risk factor for atherosclerotic heart disease. Mr. Pascual has minimal coronary disease as reflected by a 30% LAD stenosis on the 10/18 coronary arteriogram. The remainder of the coronary arteries are normal. Medical intervention for hyperlipidemia in this patient's age group is controversial. The target LDL level for primary prevention is about 100. Nonpharmacological therapy specifically diet and exercise  are emphasized as  major aspects of treatment. \par \par \par I have recommended the following:\par 1 low-salt low-fat low-cholesterol diet. Regular aerobic exercise\par 2 continue the present medical regimen\par 3 target LDL level to about 100 as discussed above\par 4 consideration for discontinuation of atorvastatin as discussed above.\par \par \par \par

## 2019-01-12 NOTE — PHYSICAL EXAM
[Normal Conjunctiva] : the conjunctiva exhibited no abnormalities [Auscultation Breath Sounds / Voice Sounds] : lungs were clear to auscultation bilaterally [Heart Sounds] : normal S1 and S2 [Edema] : no peripheral edema present [Abdomen Tenderness] : non-tender [Abnormal Walk] : normal gait [Cyanosis, Localized] : no localized cyanosis [] : no rash [Affect] : the affect was normal [FreeTextEntry1] : pleasant

## 2019-01-12 NOTE — HISTORY OF PRESENT ILLNESS
[FreeTextEntry1] : 84-year-old man\par Routine followup\par "I feel okay." Ezra reports complete resolution of previous symptoms of dyspnea on exertion and ankle edema. He does complain of feeling "tired". No orthopnea. No palpitations. No syncope.

## 2019-01-12 NOTE — REVIEW OF SYSTEMS
[Feeling Fatigued] : feeling fatigued [Joint Pain] : joint pain [Negative] : Heme/Lymph [Eyeglasses] : not currently wearing eyeglasses

## 2019-04-02 ENCOUNTER — RX RENEWAL (OUTPATIENT)
Age: 84
End: 2019-04-02

## 2019-04-04 ENCOUNTER — TRANSCRIPTION ENCOUNTER (OUTPATIENT)
Age: 84
End: 2019-04-04

## 2019-06-21 ENCOUNTER — APPOINTMENT (OUTPATIENT)
Dept: HEART AND VASCULAR | Facility: CLINIC | Age: 84
End: 2019-06-21

## 2019-07-08 ENCOUNTER — APPOINTMENT (OUTPATIENT)
Dept: HEART AND VASCULAR | Facility: CLINIC | Age: 84
End: 2019-07-08
Payer: MEDICARE

## 2019-07-08 VITALS
HEIGHT: 68 IN | HEART RATE: 59 BPM | WEIGHT: 115 LBS | SYSTOLIC BLOOD PRESSURE: 155 MMHG | DIASTOLIC BLOOD PRESSURE: 71 MMHG | BODY MASS INDEX: 17.43 KG/M2

## 2019-07-08 PROCEDURE — 93280 PM DEVICE PROGR EVAL DUAL: CPT

## 2019-07-09 ENCOUNTER — APPOINTMENT (OUTPATIENT)
Dept: CARDIOLOGY | Facility: CLINIC | Age: 84
End: 2019-07-09
Payer: MEDICARE

## 2019-07-09 VITALS
OXYGEN SATURATION: 95 % | DIASTOLIC BLOOD PRESSURE: 54 MMHG | BODY MASS INDEX: 18.09 KG/M2 | HEART RATE: 60 BPM | SYSTOLIC BLOOD PRESSURE: 138 MMHG | WEIGHT: 119 LBS

## 2019-07-09 PROCEDURE — 93000 ELECTROCARDIOGRAM COMPLETE: CPT

## 2019-07-09 PROCEDURE — 99214 OFFICE O/P EST MOD 30 MIN: CPT

## 2019-07-10 NOTE — PROCEDURE
[Pacemaker] : pacemaker [No] : not [Threshold Testing Performed] : Threshold testing was performed [Lead Imp:  ___ohms] : lead impedance was [unfilled] ohms [Sensing Amplitude ___mv] : sensing amplitude was [unfilled] mv [___V @] : [unfilled] V [___ ms] : [unfilled] ms [de-identified] : Medtronic [de-identified] : AAI-DDD [de-identified] : 13.6 years [de-identified] : AP 63\par  0.6

## 2019-07-10 NOTE — PHYSICAL EXAM
[General Appearance - Well Developed] : well developed [Normal Appearance] : normal appearance [General Appearance - Well Nourished] : well nourished [No Deformities] : no deformities [Well Groomed] : well groomed [General Appearance - In No Acute Distress] : no acute distress [Heart Rate And Rhythm] : heart rate and rhythm were normal [Heart Sounds] : normal S1 and S2 [Murmurs] : no murmurs present [Respiration, Rhythm And Depth] : normal respiratory rhythm and effort [Left Infraclavicular] : left infraclavicular area [Auscultation Breath Sounds / Voice Sounds] : lungs were clear to auscultation bilaterally [Exaggerated Use Of Accessory Muscles For Inspiration] : no accessory muscle use [Clean] : clean [Dry] : dry [Abdomen Soft] : soft [Abdomen Tenderness] : non-tender [Nail Clubbing] : no clubbing of the fingernails [Abdomen Mass (___ Cm)] : no abdominal mass palpated [Cyanosis, Localized] : no localized cyanosis [Petechial Hemorrhages (___cm)] : no petechial hemorrhages [] : no ischemic changes [Well-Healed] : well-healed [Palpable Crepitus] : no palpable crepitus

## 2019-07-10 NOTE — HISTORY OF PRESENT ILLNESS
[de-identified] : Mr. Pascual is an 84 year-old male with a history of hypertension, HLD, pAF/flutter, and severe aortic stenosis who underwent TAVR in December 2018.  Due to resultant LBBB and significant 1st degree AVB, there was concern over progression to complete heart block.  As such, he underwent dual chamber pacemaker implantation on 11/28/18.  He presents for routine follow-up and offers no device related complaints.  He denies any chest pain, SOB, MCCLELLAN, palpitations, dizziness, LE edema, orthopnea, PND, and syncope.  He reports his exercise tolerance has improved since discharge.

## 2019-07-10 NOTE — DISCUSSION/SUMMARY
[FreeTextEntry1] : Mr. Ko is an 84 year-old male with severe AS s/p TAVR with resultant LBBB/1st degree AVB who underwent dual chamber pacemaker implantation.  His device is functioning well and all measured data is WNL.  I have asked him to return in 6 months for routine device check and call with any questions or concerns in the interim.

## 2019-07-12 ENCOUNTER — NON-APPOINTMENT (OUTPATIENT)
Age: 84
End: 2019-07-12

## 2019-07-12 NOTE — DISCUSSION/SUMMARY
[FreeTextEntry1] : Valvular heart disease\par The 7/18 echocardiogram demonstrated severe aortic stenosis with a peak gradient of 84 mmHg mean gradient of 58 mmHg and aortic valve area of 0.7 cm². . In 10/18 transcatheter aortic valve replacement (TAVR) was successfully performed by Dr. Landaverde. .An 11/18 echocardiogram demonstrated normal functioning of the aortic valve bioprosthesis. Mild mitral deficiency was noted. The pulmonary artery systolic pressure was 32mm Hg The left ventricular ejection fraction was greater than 55%. The present cardiac examination is consistent with normal functioning of the aortic valve bioprosthesis and  the absence of heart failure. There has been significant symptomatic improvement following  aortic  valve replacement.\par \par I have recommended the following:\par 1.No further cardiac testing for this problem at this time\par 2 antibiotic prophylaxis for appropriate dental and surgical procedures\par 3. Echocardiogram with next office evaluation in 6 months\par \par \par \par \par Atrial flutter/fibrillation\par In 7/18 Ezra presented with atrial fibrillation ventricular rate 130/minute. Sinus rhythm was restored spontaneously. On  8/02/18  he was found to be in atrial flutter with a ventricular rate of 132/minute. . A transient left bundle branch block developed following the 10/18 TAVR procedure In 11/18 a permanent Medtronic DDDR pacemaker was implanted with the indication of a tachycardia/bradycardia syndrome, left bundle branch block - first degree AV block with episodes of marked sinus bradycardia and requirements for AV saida blocking agents to control the ventricular response in atrial flutter/fibrillation.\par An elevated "[id number]" score is indicative of an increased risk of systemic/cerebral emboli. Factor X A. inhibitor therapy is indicated. The dose of atenolol may be increased if required to control ventricular rates in atrial fibrillation/flutter.\par \par I have recommended the following:\par 1. Continue the present medical regimen\par 2. No further cardiac testing for this problem at this time\par 3. Transtelephonic monitoring of permanent pacemaker\par \par \par Hyperlipidemia\par Hyperlipidemia represents a risk factor for atherosclerotic heart disease. Mr. Pascual has minimal coronary disease as reflected by a 30% LAD stenosis on the 10/18 coronary arteriogram. The remainder of the coronary arteries are normal. Medical intervention for hyperlipidemia in this patient's age group is controversial. The target LDL level for primary prevention is about 100. Nonpharmacological therapy specifically diet and exercise  are emphasized as  major aspects of treatment. \par \par \par I have recommended the following:\par 1 low-salt low-fat low-cholesterol diet. Regular aerobic exercise\par 2 continue the present medical regimen\par 3 target LDL level to about 100 as discussed above\par 4 consideration for discontinuation of atorvastatin as discussed above.\par \par \par \par

## 2019-07-12 NOTE — PHYSICAL EXAM
[Normal Conjunctiva] : the conjunctiva exhibited no abnormalities [Heart Sounds] : normal S1 and S2 [Edema] : no peripheral edema present [Auscultation Breath Sounds / Voice Sounds] : lungs were clear to auscultation bilaterally [Abdomen Tenderness] : non-tender [Abnormal Walk] : normal gait [] : no rash [Affect] : the affect was normal [FreeTextEntry1] : Marked superficial varicosities. No peripheral edema feet. warm and well-perfused. No ulcerations.

## 2019-07-12 NOTE — HISTORY OF PRESENT ILLNESS
[FreeTextEntry1] : 84-year-old man\par Routine followup\par "I feel okay." Ezra denies chest pain, palpitations, shortness of breath peripheral edema orthopnea or syncope. He is physically active without restriction or limitation.\par Although still thin he has gained 5 pounds since his last visit here in 1/19

## 2019-08-01 ENCOUNTER — RX RENEWAL (OUTPATIENT)
Age: 84
End: 2019-08-01

## 2019-10-10 ENCOUNTER — APPOINTMENT (OUTPATIENT)
Dept: CARDIOLOGY | Facility: CLINIC | Age: 84
End: 2019-10-10
Payer: MEDICARE

## 2019-10-10 PROCEDURE — 93306 TTE W/DOPPLER COMPLETE: CPT

## 2019-10-18 ENCOUNTER — APPOINTMENT (OUTPATIENT)
Dept: CARDIOTHORACIC SURGERY | Facility: CLINIC | Age: 84
End: 2019-10-18
Payer: MEDICARE

## 2019-10-18 VITALS
TEMPERATURE: 97.9 F | DIASTOLIC BLOOD PRESSURE: 50 MMHG | WEIGHT: 121 LBS | BODY MASS INDEX: 18.4 KG/M2 | HEART RATE: 60 BPM | SYSTOLIC BLOOD PRESSURE: 114 MMHG | OXYGEN SATURATION: 97 %

## 2019-10-18 DIAGNOSIS — I35.0 NONRHEUMATIC AORTIC (VALVE) STENOSIS: ICD-10-CM

## 2019-10-18 PROCEDURE — 93000 ELECTROCARDIOGRAM COMPLETE: CPT

## 2019-10-18 PROCEDURE — 99214 OFFICE O/P EST MOD 30 MIN: CPT

## 2019-10-22 PROBLEM — I35.0 AORTIC STENOSIS: Status: ACTIVE | Noted: 2019-10-22

## 2019-10-22 NOTE — REVIEW OF SYSTEMS
[Feeling Fatigued] : feeling fatigued [Negative] : Psychiatric [Fever] : no fever [Headache] : no headache [Chills] : no chills [Shortness Of Breath] : no shortness of breath [Dyspnea on exertion] : not dyspnea during exertion [Chest  Pressure] : no chest pressure [Chest Pain] : no chest pain [Lower Ext Edema] : no extremity edema [Leg Claudication] : no intermittent leg claudication [Palpitations] : no palpitations [Dizziness] : no dizziness [Tremor] : no tremor was seen [Numbness (Hypesthesia)] : no numbness [Convulsions] : no convulsions [Tingling (Paresthesia)] : no tingling

## 2019-10-22 NOTE — HISTORY OF PRESENT ILLNESS
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 \par \par After the patient's 30 day TAVR follow up, he returned for a PPM given paroxysmal atrial fibrillation complicated by tachy-rachana syndrome. Since his last visit, the patient states he is feeling well. He reports increased fatigue throughout the day but denies any chest pain or SOB, at rest and with exertion. He also denies orthopnea, PND, dizziness, syncope, LE edema and palpitations. The patient denies any hospital admissions over the past year.

## 2019-10-22 NOTE — PHYSICAL EXAM
[Normal Appearance] : normal appearance [General Appearance - In No Acute Distress] : no acute distress [Normal Jugular Venous A Waves Present] : normal jugular venous A waves present [Normal Jugular Venous V Waves Present] : normal jugular venous V waves present [] : no respiratory distress [Respiration, Rhythm And Depth] : normal respiratory rhythm and effort [Auscultation Breath Sounds / Voice Sounds] : lungs were clear to auscultation bilaterally [Heart Rate And Rhythm] : heart rate and rhythm were normal [Edema] : no peripheral edema present [Bowel Sounds] : normal bowel sounds [Abdomen Soft] : soft [Abdomen Tenderness] : non-tender [Abnormal Walk] : normal gait [Nail Clubbing] : no clubbing of the fingernails [Cyanosis, Localized] : no localized cyanosis [Skin Color & Pigmentation] : normal skin color and pigmentation [Heart Sounds] : normal S1 and S2 [FreeTextEntry1] : Soft MARIANN best heard at the RSB.

## 2019-11-12 ENCOUNTER — RX RENEWAL (OUTPATIENT)
Age: 84
End: 2019-11-12

## 2019-11-18 ENCOUNTER — RX RENEWAL (OUTPATIENT)
Age: 84
End: 2019-11-18

## 2020-01-06 ENCOUNTER — APPOINTMENT (OUTPATIENT)
Dept: CARDIOLOGY | Facility: CLINIC | Age: 85
End: 2020-01-06

## 2020-01-13 ENCOUNTER — APPOINTMENT (OUTPATIENT)
Dept: CARDIOLOGY | Facility: CLINIC | Age: 85
End: 2020-01-13
Payer: MEDICARE

## 2020-01-13 VITALS
HEART RATE: 60 BPM | BODY MASS INDEX: 18.85 KG/M2 | DIASTOLIC BLOOD PRESSURE: 70 MMHG | OXYGEN SATURATION: 98 % | SYSTOLIC BLOOD PRESSURE: 140 MMHG | WEIGHT: 124 LBS

## 2020-01-13 DIAGNOSIS — Z85.828 PERSONAL HISTORY OF OTHER MALIGNANT NEOPLASM OF SKIN: ICD-10-CM

## 2020-01-13 PROCEDURE — 99215 OFFICE O/P EST HI 40 MIN: CPT

## 2020-01-14 NOTE — HISTORY OF PRESENT ILLNESS
[FreeTextEntry1] : 85 year  old man\par Routine followup\par \par "I feel good." Ezra denies chest pain, palpitations, shortness of breath or syncope. No orthopnea. No ankle edema.  His main complaint is that of fatigue. His sleeping pattern is interrupted by low back pain and pruritus attributed to skin cancers.

## 2020-01-14 NOTE — DISCUSSION/SUMMARY
[FreeTextEntry1] : Valvular heart disease\par The 7/18 echocardiogram demonstrated severe aortic stenosis with a peak gradient of 84 mmHg mean gradient of 58 mmHg and aortic valve area of 0.7 cm². . In 10/18 transcatheter aortic valve replacement (TAVR) was successfully performed by Dr. Landaverde. .A 10/19  echocardiogram demonstrated normal functioning of the aortic valve bioprosthesis. Mild mitral deficiency was noted. The pulmonary artery systolic pressure was  26 mm Hg The left ventricular ejection fraction was  60- 65%. The present cardiac examination is consistent with normal functioning of the aortic valve bioprosthesis and  the absence of heart failure. There has been significant symptomatic improvement following  aortic  valve replacement.\par \par I have recommended the following:\par 1.No further cardiac testing for this problem at this time\par 2 Antibiotic prophylaxis for appropriate dental and surgical procedures\par \par \par \par \par Atrial flutter/fibrillation\par In 7/18 Ezra presented with atrial fibrillation ventricular rate 130/minute. Sinus rhythm was restored spontaneously. In 8/18  he was found to be in atrial flutter with a ventricular rate of 132/minute. . A transient left bundle branch block developed following the 10/18 TAVR procedure In 11/18 a permanent Medtronic DDDR pacemaker was implanted with the indication of a tachycardia/bradycardia syndrome, left bundle branch block - first degree AV block with episodes of marked sinus bradycardia and requirements for AV saida blocking agents to control the ventricular response in atrial flutter/fibrillation.\par An elevated "[id number]" score is indicative of an increased risk of systemic/cerebral emboli. Factor X A. inhibitor therapy is indicated. The dose of atenolol may be increased if required to control ventricular rates in atrial fibrillation/flutter.\par \par I have recommended the following:\par 1. Continue the present medical regimen\par 2. No further cardiac testing for this problem at this time\par 3. Transtelephonic monitoring of permanent pacemaker\par \par \par Hyperlipidemia\par Hyperlipidemia represents a risk factor for atherosclerotic heart disease. Mr. Pascual has minimal coronary disease as reflected by a 30% LAD stenosis on the 10/18 coronary arteriogram. The remainder of the coronary arteries are normal. Medical intervention for hyperlipidemia in this patient's age group is controversial. The target LDL level for primary prevention is about 100. Lipitor was stopped in 2019. In 12/19 the serum cholesterol level was 223 triglycerides 73 HDL 81 and   Nonpharmacological therapy specifically diet and exercise  are emphasized as  major aspects of treatment. \par \par \par I have recommended the following:\par 1 low-salt low-fat low-cholesterol diet. Regular aerobic exercise\par 2 continue the present medical regimen\par 3 target LDL level to about 100 as discussed above\par \par \par The diagnosis, prognosis, risks, options and alternatives were explained at length to the patient. All questions were answered. Issues discussed included valvular heart disease, atherosclerotic heart disease hyperlipidemia. In addition atrial flutter/fibrillation and treatment with anticoagulation weighing the risks of bleeding versus embolic stroke were reviewed.  Counseling and coordination of care:\par Time was a significant factor for this patient encounter. Total time spent with the patient was 40 minutes. 50% of the time was devoted to counseling and coordination of care.\par \par

## 2020-01-14 NOTE — PHYSICAL EXAM
[Normal Conjunctiva] : the conjunctiva exhibited no abnormalities [Heart Sounds] : normal S1 and S2 [Auscultation Breath Sounds / Voice Sounds] : lungs were clear to auscultation bilaterally [Edema] : no peripheral edema present [Abnormal Walk] : normal gait [Abdomen Tenderness] : non-tender [] : no rash [Affect] : the affect was normal [FreeTextEntry1] : pleasant

## 2020-01-14 NOTE — REVIEW OF SYSTEMS
[Feeling Fatigued] : feeling fatigued [Joint Pain] : joint pain [Negative] : Psychiatric [Eyeglasses] : not currently wearing eyeglasses

## 2020-07-16 ENCOUNTER — APPOINTMENT (OUTPATIENT)
Dept: CARDIOLOGY | Facility: CLINIC | Age: 85
End: 2020-07-16
Payer: MEDICARE

## 2020-07-16 VITALS
BODY MASS INDEX: 18.25 KG/M2 | HEART RATE: 63 BPM | OXYGEN SATURATION: 99 % | WEIGHT: 120 LBS | TEMPERATURE: 98.1 F | DIASTOLIC BLOOD PRESSURE: 80 MMHG | SYSTOLIC BLOOD PRESSURE: 144 MMHG

## 2020-07-16 PROCEDURE — 93000 ELECTROCARDIOGRAM COMPLETE: CPT

## 2020-07-16 PROCEDURE — 99214 OFFICE O/P EST MOD 30 MIN: CPT

## 2020-07-17 ENCOUNTER — NON-APPOINTMENT (OUTPATIENT)
Age: 85
End: 2020-07-17

## 2020-07-17 NOTE — PHYSICAL EXAM
[Normal Conjunctiva] : the conjunctiva exhibited no abnormalities [Auscultation Breath Sounds / Voice Sounds] : lungs were clear to auscultation bilaterally [Heart Sounds] : normal S1 and S2 [Edema] : no peripheral edema present [Abnormal Walk] : normal gait [Abdomen Tenderness] : non-tender [] : no rash [Affect] : the affect was normal [FreeTextEntry1] : pleasant

## 2020-07-17 NOTE — DISCUSSION/SUMMARY
[FreeTextEntry1] : Valvular heart disease\par The 7/18 echocardiogram demonstrated severe aortic stenosis with a peak gradient of 84 mmHg mean gradient of 58 mmHg and aortic valve area of 0.7 cm². . In 10/18 transcatheter aortic valve replacement (TAVR) was successfully performed by Dr. Landaverde. .A 10/19  echocardiogram demonstrated normal functioning of the aortic valve bioprosthesis. Mild mitral deficiency was noted. The pulmonary artery systolic pressure was  26 mm Hg The left ventricular ejection fraction was  60- 65%. The present cardiac examination is consistent with normal functioning of the aortic valve bioprosthesis and  the absence of heart failure. There has been significant symptomatic improvement following  aortic  valve replacement.\par \par I have recommended the following:\par 1.No further cardiac testing for this problem at this time\par 2 Antibiotic prophylaxis for appropriate dental and surgical procedures\par \par \par \par \par Atrial flutter/fibrillation\par In 7/18 Ezra presented with atrial fibrillation ventricular rate 130/minute. Sinus rhythm was restored spontaneously. In 8/18  he was found to be in atrial flutter with a ventricular rate of 132/minute. . A transient left bundle branch block developed following the 10/18 TAVR procedure In 11/18 a permanent Medtronic DDDR pacemaker was implanted with the indication of a tachycardia/bradycardia syndrome, left bundle branch block - first degree AV block with episodes of marked sinus bradycardia and requirements for AV saida blocking agents to control the ventricular response in atrial flutter/fibrillation.\par An elevated "[id number]" score is indicative of an increased risk of systemic/cerebral emboli. Factor Xa. inhibitor therapy is indicated. The dose of atenolol may be increased if required to control ventricular rates in atrial fibrillation/flutter.\par \par I have recommended the following:\par 1. Continue the present medical regimen\par 2. No further cardiac testing for this problem at this time\par 3. Transtelephonic monitoring of permanent pacemaker\par \par \par Hyperlipidemia\par Hyperlipidemia represents a risk factor for atherosclerotic heart disease. Mr. Pascual has minimal coronary disease as reflected by a 30% LAD stenosis on the 10/18 coronary arteriogram. The remainder of the coronary arteries are normal. Medical intervention for hyperlipidemia in this patient's age group is controversial. The target LDL level for primary prevention is about 100. Lipitor was stopped in 2019. In 12/19 the serum cholesterol level was 223 triglycerides 73 HDL 81 and   Nonpharmacological therapy specifically diet and exercise  are emphasized as  major aspects of treatment. \par \par \par I have recommended the following:\par 1 low-salt low-fat low-cholesterol diet. Regular aerobic exercise\par 2 continue the present medical regimen\par 3 target LDL level to about 100 as discussed above\par \par \par The diagnosis, prognosis, risks, options and alternatives were explained at length to the patient. All questions were answered. Issues discussed included valvular heart disease, atherosclerotic heart disease hyperlipidemia. In addition atrial flutter/fibrillation and treatment with anticoagulation weighing the risks of bleeding versus embolic stroke were reviewed.  \par \par \par Counseling and coordination of care:\par Time was a significant factor for this patient encounter. Total time spent with the patient was 25  minutes. 50% of the time was devoted to counseling and coordination of care.\par \par

## 2020-07-17 NOTE — HISTORY OF PRESENT ILLNESS
[FreeTextEntry1] : 85-year-old man\par Routine followup\par "I feel okay "  Ezra denies chest pain, shortness of breath, palpitations ankle edema orthopnea or syncope. He is physically active walking daily without restriction or limitation. Main complaints is that of chronic low back pain and pruritus of the chest which makes sleeping difficult. As a result there is an element of daytime fatigue. These symptoms are unchanged from the past.

## 2020-07-26 ENCOUNTER — RX RENEWAL (OUTPATIENT)
Age: 85
End: 2020-07-26

## 2020-12-02 ENCOUNTER — NON-APPOINTMENT (OUTPATIENT)
Age: 85
End: 2020-12-02

## 2020-12-02 ENCOUNTER — TRANSCRIPTION ENCOUNTER (OUTPATIENT)
Age: 85
End: 2020-12-02

## 2020-12-03 ENCOUNTER — RESULT REVIEW (OUTPATIENT)
Age: 85
End: 2020-12-03

## 2020-12-14 ENCOUNTER — APPOINTMENT (OUTPATIENT)
Dept: CARDIOLOGY | Facility: CLINIC | Age: 85
End: 2020-12-14
Payer: MEDICARE

## 2020-12-14 PROCEDURE — 93306 TTE W/DOPPLER COMPLETE: CPT

## 2020-12-14 PROCEDURE — 99072 ADDL SUPL MATRL&STAF TM PHE: CPT

## 2020-12-18 ENCOUNTER — NON-APPOINTMENT (OUTPATIENT)
Age: 85
End: 2020-12-18

## 2021-01-14 ENCOUNTER — APPOINTMENT (OUTPATIENT)
Dept: CARDIOLOGY | Facility: CLINIC | Age: 86
End: 2021-01-14
Payer: MEDICARE

## 2021-01-14 ENCOUNTER — NON-APPOINTMENT (OUTPATIENT)
Age: 86
End: 2021-01-14

## 2021-01-14 VITALS
SYSTOLIC BLOOD PRESSURE: 126 MMHG | OXYGEN SATURATION: 98 % | DIASTOLIC BLOOD PRESSURE: 58 MMHG | TEMPERATURE: 97.8 F | HEART RATE: 60 BPM | BODY MASS INDEX: 17.49 KG/M2 | WEIGHT: 115 LBS

## 2021-01-14 PROCEDURE — 99213 OFFICE O/P EST LOW 20 MIN: CPT

## 2021-01-14 PROCEDURE — 93000 ELECTROCARDIOGRAM COMPLETE: CPT

## 2021-01-16 NOTE — HISTORY OF PRESENT ILLNESS
[FreeTextEntry1] : 86-year-old man\par Routine followup\par "I feel okay."  Mr. Pascual denies chest pain, palpitations, shortness of breath ankle edema orthopnea or syncope.

## 2021-01-16 NOTE — PHYSICAL EXAM
[Normal Conjunctiva] : the conjunctiva exhibited no abnormalities [Auscultation Breath Sounds / Voice Sounds] : lungs were clear to auscultation bilaterally [Heart Sounds] : normal S1 and S2 [Edema] : no peripheral edema present [Abdomen Tenderness] : non-tender [Abnormal Walk] : normal gait [] : no rash [Affect] : the affect was normal [FreeTextEntry1] : Marked superficial varicosities. No peripheral edema feet. warm and well-perfused. No ulcerations.

## 2021-01-16 NOTE — DISCUSSION/SUMMARY
[FreeTextEntry1] : Valvular heart disease\par The 7/18 echocardiogram demonstrated severe aortic stenosis with a peak gradient of 84 mmHg mean gradient of 58 mmHg and aortic valve area of 0.7 cm². . 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 Landaverde.    .A 12 / 20   echocardiogram demonstrated normal functioning of the aortic valve bioprosthesis. Mild mitral  and tricuspid regurgitation were noted. The pulmonary artery systolic pressure was  26 mm Hg The left ventricular ejection fraction was 56%. The present cardiac examination is consistent with normal functioning of the aortic valve bioprosthesis and  the absence of heart failure. There has been significant symptomatic improvement following  aortic  valve replacement.\par \par I have recommended the following:\par 1.No further cardiac testing for this problem at this time\par 2 Antibiotic prophylaxis for appropriate dental and surgical procedures\par \par \par \par \par Atrial flutter/fibrillation\par In 7/18 Ezra presented with atrial fibrillation ventricular rate 130/minute. Sinus rhythm was restored spontaneously. In 8/18  he was found to be in atrial flutter with a ventricular rate of 132/minute. . 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 Factor Xa. inhibitor therapy is indicated. The dose of atenolol may be increased if required to control ventricular rates in atrial fibrillation/flutter.\par \par I have recommended the following:\par 1. Continue the present medical regimen\par 2. No further cardiac testing for this problem at this time\par 3. Transtelephonic monitoring of permanent pacemaker\par 4. Formal  interrogation of the permanent pacemaker with  next office evaluation in 6 months\par \par \par Hyperlipidemia\par Hyperlipidemia represents a risk factor for atherosclerotic heart disease. Mr. Pascual has minimal coronary disease as reflected by a 30% LAD stenosis on the 10/18 coronary arteriogram. The remainder of the coronary arteries are normal. Medical intervention for hyperlipidemia in this patient's age group is controversial. The target LDL level for primary prevention is about 100. Lipitor was stopped in 2019. In 12/19 the serum cholesterol level was 223 triglycerides 73 HDL 81 and   Nonpharmacological therapy specifically diet and exercise  are emphasized as  major aspects of treatment. \par \par \par I have recommended the following:\par 1 low-salt low-fat low-cholesterol diet. Regular aerobic exercise\par 2 continue the present medical regimen\par 3 target LDL level to about 100 as discussed above\par \par \par The diagnosis, prognosis, risks, options and alternatives were explained at length to the patient. All questions were answered. Issues discussed included valvular heart disease, atherosclerotic heart disease hyperlipidemia. In addition atrial flutter/fibrillation and treatment with anticoagulation weighing the risks of bleeding versus embolic stroke were reviewed.  \par The importance of antibiotic prophylaxis for appropriate dental and surgical procedures was emphasized.\par \par Counseling and coordination of care:\par Time was a significant factor for this patient encounter. Total time spent with the patient was 25  minutes. 50% of the time was devoted to counseling and coordination of care.\par \par

## 2021-01-18 NOTE — DISCHARGE NOTE ADULT - NSFTFHOMEOTHERFT_GEN_ALL_CORE
education for new medications s/p TAVR
no abrasions, no jaundice, no lesions, no pruritis, and no rashes.

## 2021-02-24 ENCOUNTER — APPOINTMENT (OUTPATIENT)
Dept: HEART AND VASCULAR | Facility: CLINIC | Age: 86
End: 2021-02-24
Payer: MEDICARE

## 2021-02-24 ENCOUNTER — NON-APPOINTMENT (OUTPATIENT)
Age: 86
End: 2021-02-24

## 2021-02-24 PROCEDURE — 93294 REM INTERROG EVL PM/LDLS PM: CPT

## 2021-02-24 PROCEDURE — 93296 REM INTERROG EVL PM/IDS: CPT

## 2021-05-26 ENCOUNTER — NON-APPOINTMENT (OUTPATIENT)
Age: 86
End: 2021-05-26

## 2021-05-26 ENCOUNTER — APPOINTMENT (OUTPATIENT)
Dept: HEART AND VASCULAR | Facility: CLINIC | Age: 86
End: 2021-05-26
Payer: MEDICARE

## 2021-05-26 PROCEDURE — 93294 REM INTERROG EVL PM/LDLS PM: CPT

## 2021-05-26 PROCEDURE — 93296 REM INTERROG EVL PM/IDS: CPT

## 2021-06-16 ENCOUNTER — RESULT REVIEW (OUTPATIENT)
Age: 86
End: 2021-06-16

## 2021-07-08 ENCOUNTER — RX RENEWAL (OUTPATIENT)
Age: 86
End: 2021-07-08

## 2021-07-14 ENCOUNTER — APPOINTMENT (OUTPATIENT)
Dept: CARDIOLOGY | Facility: CLINIC | Age: 86
End: 2021-07-14

## 2021-07-20 ENCOUNTER — APPOINTMENT (OUTPATIENT)
Dept: CARDIOLOGY | Facility: CLINIC | Age: 86
End: 2021-07-20
Payer: MEDICARE

## 2021-07-20 VITALS
HEIGHT: 68 IN | OXYGEN SATURATION: 98 % | WEIGHT: 115 LBS | HEART RATE: 66 BPM | SYSTOLIC BLOOD PRESSURE: 112 MMHG | BODY MASS INDEX: 17.43 KG/M2 | DIASTOLIC BLOOD PRESSURE: 62 MMHG | TEMPERATURE: 98.6 F

## 2021-07-20 DIAGNOSIS — Z87.898 PERSONAL HISTORY OF OTHER SPECIFIED CONDITIONS: ICD-10-CM

## 2021-07-20 PROCEDURE — 93000 ELECTROCARDIOGRAM COMPLETE: CPT

## 2021-07-20 PROCEDURE — 99213 OFFICE O/P EST LOW 20 MIN: CPT

## 2021-07-21 ENCOUNTER — NON-APPOINTMENT (OUTPATIENT)
Age: 86
End: 2021-07-21

## 2021-07-21 PROBLEM — Z87.898 HISTORY OF INSOMNIA: Status: RESOLVED | Noted: 2021-07-21 | Resolved: 2021-07-21

## 2021-07-21 NOTE — PHYSICAL EXAM
[Normal Conjunctiva] : the conjunctiva exhibited no abnormalities [Auscultation Breath Sounds / Voice Sounds] : lungs were clear to auscultation bilaterally [Heart Sounds] : normal S1 and S2 [Edema] : no peripheral edema present [Abdomen Tenderness] : non-tender [Abnormal Walk] : normal gait [] : no rash [Affect] : the affect was normal [FreeTextEntry1] : pleasant

## 2021-07-21 NOTE — HISTORY OF PRESENT ILLNESS
[FreeTextEntry1] : 86-year-old man\par Routine followup\par "I feel okay." Ezra denies chest pain, palpitations, shortness of breath or syncope. No ankle edema. No orthopnea. No bleeding complications while taking apixaban.

## 2021-07-21 NOTE — DISCUSSION/SUMMARY
[FreeTextEntry1] : Valvular heart disease\par The 7/18 echocardiogram demonstrated severe aortic stenosis with a peak gradient of 84 mmHg mean gradient of 58 mmHg and aortic valve area of 0.7 cm². . 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 Landaverde.    .A 12 / 20   echocardiogram demonstrated normal functioning of the aortic valve bioprosthesis. Mild mitral  and tricuspid regurgitation were noted. The pulmonary artery systolic pressure was  26 mm Hg The left ventricular ejection fraction was 56%. The present cardiac examination is consistent with normal functioning of the aortic valve bioprosthesis and  the absence of heart failure. There has been significant symptomatic improvement following  aortic  valve replacement.\par \par I have recommended the following:\par 1.No further cardiac testing for this problem at this time\par 2 Antibiotic prophylaxis for appropriate dental and surgical procedures\par \par \par \par \par Atrial flutter/fibrillation\par In 7/18 Ezra presented with atrial fibrillation ventricular rate 130/minute. Sinus rhythm was restored spontaneously. In 8/18  he was found to be in atrial flutter with a ventricular rate of 132/minute. . 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 Factor Xa. inhibitor therapy is indicated. The dose of atenolol may be increased if required to control ventricular rates in atrial fibrillation/flutter.\par \par I have recommended the following:\par 1. Continue the present medical regimen\par 2. No further cardiac testing for this problem at this time\par 3. Transtelephonic monitoring of permanent pacemaker\par \par \par \par Hyperlipidemia\par Hyperlipidemia represents a risk factor for atherosclerotic heart disease. Mr. Pascual has minimal coronary disease as reflected by a 30% LAD stenosis on the 10/18 coronary arteriogram. The remainder of the coronary arteries are normal. Medical intervention for hyperlipidemia in this patient's age group is controversial. The target LDL level for primary prevention is about 100. Lipitor was stopped in 2019. In 12/19 the serum cholesterol level was 223 triglycerides 73 HDL 81 and   Nonpharmacological therapy specifically diet and exercise  are emphasized as  major aspects of treatment. \par \par \par I have recommended the following:\par 1 low-salt low-fat low-cholesterol diet. Regular aerobic exercise\par 2 continue the present medical regimen\par 3 target LDL level to about 100 as discussed above\par \par \par The diagnosis, prognosis, risks, options and alternatives were explained at length to the patient. All questions were answered. Issues discussed included valvular heart disease, atherosclerotic heart disease hyperlipidemia. In addition atrial flutter/fibrillation and treatment with anticoagulation weighing the risks of bleeding versus embolic stroke were reviewed.  \par The importance of antibiotic prophylaxis for appropriate dental and surgical procedures was emphasized.\par \par Counseling and coordination of care:\par Time was a significant factor for this patient encounter. Total time spent with the patient was 25  minutes. 50% of the time was devoted to counseling and coordination of care.\par \par

## 2021-08-25 ENCOUNTER — APPOINTMENT (OUTPATIENT)
Dept: HEART AND VASCULAR | Facility: CLINIC | Age: 86
End: 2021-08-25
Payer: MEDICARE

## 2021-08-25 ENCOUNTER — NON-APPOINTMENT (OUTPATIENT)
Age: 86
End: 2021-08-25

## 2021-08-25 PROCEDURE — 93296 REM INTERROG EVL PM/IDS: CPT

## 2021-08-25 PROCEDURE — 93294 REM INTERROG EVL PM/LDLS PM: CPT

## 2021-08-26 ENCOUNTER — RESULT REVIEW (OUTPATIENT)
Age: 86
End: 2021-08-26

## 2021-10-04 ENCOUNTER — RESULT REVIEW (OUTPATIENT)
Age: 86
End: 2021-10-04

## 2021-10-19 ENCOUNTER — RESULT REVIEW (OUTPATIENT)
Age: 86
End: 2021-10-19

## 2021-11-24 ENCOUNTER — APPOINTMENT (OUTPATIENT)
Dept: HEART AND VASCULAR | Facility: CLINIC | Age: 86
End: 2021-11-24
Payer: MEDICARE

## 2021-11-24 ENCOUNTER — NON-APPOINTMENT (OUTPATIENT)
Age: 86
End: 2021-11-24

## 2021-11-24 PROCEDURE — 93296 REM INTERROG EVL PM/IDS: CPT | Mod: NC

## 2021-11-24 PROCEDURE — 93294 REM INTERROG EVL PM/LDLS PM: CPT | Mod: NC

## 2022-01-21 ENCOUNTER — APPOINTMENT (OUTPATIENT)
Dept: CARDIOLOGY | Facility: CLINIC | Age: 87
End: 2022-01-21
Payer: MEDICARE

## 2022-01-21 ENCOUNTER — NON-APPOINTMENT (OUTPATIENT)
Age: 87
End: 2022-01-21

## 2022-01-21 VITALS
HEART RATE: 60 BPM | OXYGEN SATURATION: 97 % | HEIGHT: 68 IN | BODY MASS INDEX: 17.43 KG/M2 | DIASTOLIC BLOOD PRESSURE: 78 MMHG | SYSTOLIC BLOOD PRESSURE: 156 MMHG | WEIGHT: 115 LBS

## 2022-01-21 PROCEDURE — 93000 ELECTROCARDIOGRAM COMPLETE: CPT

## 2022-01-21 PROCEDURE — 99213 OFFICE O/P EST LOW 20 MIN: CPT

## 2022-01-22 NOTE — HISTORY OF PRESENT ILLNESS
[FreeTextEntry1] : 87-year-old man\par Routine followup\par "I feel okay."  zEra denies chest pain, shortness of breath, palpitations or syncope. Rare episodes of "lightheadedness."

## 2022-01-22 NOTE — DISCUSSION/SUMMARY
[FreeTextEntry1] : Valvular heart disease\par The 7/18 echocardiogram demonstrated severe aortic stenosis with a peak gradient of 84 mmHg mean gradient of 58 mmHg and aortic valve area of 0.7 cm². . 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 Landaverde.    .A 12 / 20   echocardiogram demonstrated normal functioning of the aortic valve bioprosthesis. Mild mitral  and tricuspid regurgitation were noted. The pulmonary artery systolic pressure was  26 mm Hg The left ventricular ejection fraction was 56%. The present cardiac examination is consistent with normal functioning of the aortic valve bioprosthesis and  the absence of heart failure. There has been significant symptomatic improvement following  aortic  valve replacement.\par \par I have recommended the following:\par 1.No further cardiac testing for this problem at this time\par 2 Antibiotic prophylaxis for appropriate dental and surgical procedures\par 3.Echocardiogram with next office evaluation in 6 months\par \par \par \par \par Atrial flutter/fibrillation\par In 7/18 Ezra presented with atrial fibrillation ventricular rate 130/minute. Sinus rhythm was restored spontaneously. In 8/18  he was found to be in atrial flutter with a ventricular rate of 132/minute. . 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 Factor Xa. inhibitor therapy is indicated. The dose of atenolol may be increased if required to control ventricular rates in atrial fibrillation/flutter.\par \par I have recommended the following:\par 1. Continue the present medical regimen\par 2. No further cardiac testing for this problem at this time\par 3. Transtelephonic monitoring of permanent pacemaker\par \par \par \par Hyperlipidemia\par Hyperlipidemia represents a risk factor for atherosclerotic heart disease. Mr. Pascual has minimal coronary disease as reflected by a 30% LAD stenosis on the 10/18 coronary arteriogram. The remainder of the coronary arteries are normal. Medical intervention for hyperlipidemia in this patient's age group is controversial. The target LDL level for primary prevention is about 100. Lipitor was stopped in 2019. In 12/19 the serum cholesterol level was 223 triglycerides 73 HDL 81 and    More recent lipid levels are not available for review.   Nonpharmacological therapy specifically diet and exercise  are emphasized as  major aspects of treatment. \par \par \par I have recommended the following:\par 1 low-salt low-fat low-cholesterol diet. Regular aerobic exercise\par 2 continue the present medical regimen\par 3 target LDL level to about 100 as discussed above\par \par \par \par \par \par The diagnosis, prognosis, risks, options and alternatives were explained at length to the patient. All questions were answered. Issues discussed included valvular heart disease, atherosclerotic heart disease hyperlipidemia. In addition atrial flutter/fibrillation and treatment with anticoagulation weighing the risks of bleeding versus embolic stroke were reviewed.  \par The importance of antibiotic prophylaxis for appropriate dental and surgical procedures was emphasized.\par \par Counseling and coordination of care:\par Time was a significant factor for this patient encounter. Total time spent with the patient was 25  minutes. 50% of the time was devoted to counseling and coordination of care.\par \par

## 2022-02-23 ENCOUNTER — APPOINTMENT (OUTPATIENT)
Dept: HEART AND VASCULAR | Facility: CLINIC | Age: 87
End: 2022-02-23

## 2022-03-31 ENCOUNTER — NON-APPOINTMENT (OUTPATIENT)
Age: 87
End: 2022-03-31

## 2022-03-31 ENCOUNTER — APPOINTMENT (OUTPATIENT)
Dept: HEART AND VASCULAR | Facility: CLINIC | Age: 87
End: 2022-03-31
Payer: MEDICARE

## 2022-03-31 PROCEDURE — 93296 REM INTERROG EVL PM/IDS: CPT

## 2022-03-31 PROCEDURE — 93294 REM INTERROG EVL PM/LDLS PM: CPT

## 2022-06-30 ENCOUNTER — NON-APPOINTMENT (OUTPATIENT)
Age: 87
End: 2022-06-30

## 2022-06-30 ENCOUNTER — APPOINTMENT (OUTPATIENT)
Dept: HEART AND VASCULAR | Facility: CLINIC | Age: 87
End: 2022-06-30

## 2022-06-30 PROCEDURE — 93296 REM INTERROG EVL PM/IDS: CPT

## 2022-06-30 PROCEDURE — 93294 REM INTERROG EVL PM/LDLS PM: CPT

## 2022-07-06 ENCOUNTER — NON-APPOINTMENT (OUTPATIENT)
Age: 87
End: 2022-07-06

## 2022-07-07 ENCOUNTER — APPOINTMENT (OUTPATIENT)
Dept: CARDIOLOGY | Facility: CLINIC | Age: 87
End: 2022-07-07

## 2022-07-07 PROCEDURE — 93306 TTE W/DOPPLER COMPLETE: CPT

## 2022-07-15 ENCOUNTER — NON-APPOINTMENT (OUTPATIENT)
Age: 87
End: 2022-07-15

## 2022-07-15 ENCOUNTER — APPOINTMENT (OUTPATIENT)
Dept: CARDIOLOGY | Facility: CLINIC | Age: 87
End: 2022-07-15

## 2022-07-15 VITALS
HEART RATE: 57 BPM | WEIGHT: 116 LBS | SYSTOLIC BLOOD PRESSURE: 132 MMHG | OXYGEN SATURATION: 98 % | BODY MASS INDEX: 17.64 KG/M2 | DIASTOLIC BLOOD PRESSURE: 58 MMHG

## 2022-07-15 PROCEDURE — 93000 ELECTROCARDIOGRAM COMPLETE: CPT

## 2022-07-15 PROCEDURE — 99213 OFFICE O/P EST LOW 20 MIN: CPT | Mod: 25

## 2022-07-16 NOTE — DISCUSSION/SUMMARY
[FreeTextEntry1] : Valvular heart disease\par The 7/18 echocardiogram demonstrated severe aortic stenosis with a peak gradient of 84 mmHg mean gradient of 58 mmHg and aortic valve area of 0.7 cm². . 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 Landaverde.    .A   7/22   echocardiogram demonstrated normal functioning of the aortic valve bioprosthesis.  The peak gradient was 9 mmHg mean gradient 6.2 mmHg. DVI 0.52. Mild mitral/aortic/tricuspid regurgitation were noted. The pulmonary artery systolic pressure was  32  mm Hg The left ventricular ejection fraction was 61 %. The present cardiac examination is consistent with normal functioning of the aortic valve bioprosthesis and  the absence of heart failure. There has been significant symptomatic improvement following  aortic  valve replacement.\par \par I have recommended the following:\par 1.No further cardiac testing for this problem at this time\par 2 Antibiotic prophylaxis for appropriate dental and surgical procedures\par \par \par \par \par \par Atrial flutter/fibrillation\par In 7/18 Ezra presented with atrial fibrillation ventricular rate 130/minute. Sinus rhythm was restored spontaneously. In 8/18  he was found to be in atrial flutter with a ventricular rate of 132/minute. . 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 Factor Xa. inhibitor therapy is indicated. The dose of atenolol may be increased if required to control ventricular rates in atrial fibrillation/flutter.\par \par I have recommended the following:\par 1. Continue the present medical regimen\par 2. No further cardiac testing for this problem at this time\par 3. Transtelephonic monitoring of permanent pacemaker\par \par \par \par Hyperlipidemia\par Hyperlipidemia represents a risk factor for atherosclerotic heart disease. Mr. Pascual has minimal coronary disease as reflected by a 30% LAD stenosis on the 10/18 coronary arteriogram. The remainder of the coronary arteries are normal. Medical intervention for hyperlipidemia in this patient's age group is controversial. The target LDL level for primary prevention is about 100. Lipitor was stopped in 2019. In 12/19 the serum cholesterol level was 223 triglycerides 73 HDL 81 and    More recent lipid levels are not available for review.   Nonpharmacological therapy specifically diet and exercise  are emphasized as  major aspects of treatment. \par \par \par I have recommended the following:\par 1 low-salt low-fat low-cholesterol diet. Regular aerobic exercise\par 2 continue the present medical regimen\par 3 target LDL level to about 100 as discussed above\par \par \par \par \par \par The diagnosis, prognosis, risks, options and alternatives were explained at length to the patient. All questions were answered. Issues discussed included valvular heart disease, atherosclerotic heart disease hyperlipidemia. In addition atrial flutter/fibrillation and treatment with anticoagulation weighing the risks of bleeding versus embolic stroke were reviewed.  \par The importance of antibiotic prophylaxis for appropriate dental and surgical procedures was emphasized.\par \par Counseling and coordination of care:\par Time was a significant factor for this patient encounter. Total time spent with the patient was 25  minutes. 50% of the time was devoted to counseling and coordination of care.\par \par

## 2022-07-16 NOTE — HISTORY OF PRESENT ILLNESS
[FreeTextEntry1] : 87-year-old  man\par Routine followup\par "I feel okay." Ezra denies chest pain, palpitations, shortness of breath ankle edema orthopnea or syncope. No bleeding complications while taking apixaban.

## 2022-07-16 NOTE — PHYSICAL EXAM
[Normal Conjunctiva] : the conjunctiva exhibited no abnormalities [Auscultation Breath Sounds / Voice Sounds] : lungs were clear to auscultation bilaterally [Heart Sounds] : normal S1 and S2 [Abdomen Tenderness] : non-tender [Abnormal Walk] : normal gait [] : no rash [Affect] : the affect was normal [FreeTextEntry1] : pleasant No

## 2022-09-28 ENCOUNTER — NON-APPOINTMENT (OUTPATIENT)
Age: 87
End: 2022-09-28

## 2022-09-29 ENCOUNTER — NON-APPOINTMENT (OUTPATIENT)
Age: 87
End: 2022-09-29

## 2022-09-29 ENCOUNTER — APPOINTMENT (OUTPATIENT)
Dept: HEART AND VASCULAR | Facility: CLINIC | Age: 87
End: 2022-09-29

## 2022-09-29 PROCEDURE — 93296 REM INTERROG EVL PM/IDS: CPT

## 2022-09-29 PROCEDURE — 93294 REM INTERROG EVL PM/LDLS PM: CPT

## 2022-12-29 ENCOUNTER — NON-APPOINTMENT (OUTPATIENT)
Age: 87
End: 2022-12-29

## 2022-12-29 ENCOUNTER — APPOINTMENT (OUTPATIENT)
Dept: HEART AND VASCULAR | Facility: CLINIC | Age: 87
End: 2022-12-29
Payer: MEDICARE

## 2022-12-29 PROCEDURE — 93294 REM INTERROG EVL PM/LDLS PM: CPT

## 2022-12-29 PROCEDURE — 93296 REM INTERROG EVL PM/IDS: CPT

## 2023-01-13 ENCOUNTER — APPOINTMENT (OUTPATIENT)
Dept: CARDIOLOGY | Facility: CLINIC | Age: 88
End: 2023-01-13
Payer: MEDICARE

## 2023-01-13 VITALS
HEIGHT: 68 IN | BODY MASS INDEX: 18.04 KG/M2 | WEIGHT: 119 LBS | OXYGEN SATURATION: 98 % | HEART RATE: 61 BPM | SYSTOLIC BLOOD PRESSURE: 154 MMHG | DIASTOLIC BLOOD PRESSURE: 66 MMHG

## 2023-01-13 PROCEDURE — 99214 OFFICE O/P EST MOD 30 MIN: CPT

## 2023-01-13 PROCEDURE — 93000 ELECTROCARDIOGRAM COMPLETE: CPT

## 2023-01-14 ENCOUNTER — NON-APPOINTMENT (OUTPATIENT)
Age: 88
End: 2023-01-14

## 2023-01-14 NOTE — PHYSICAL EXAM
[Normal Conjunctiva] : the conjunctiva exhibited no abnormalities [Auscultation Breath Sounds / Voice Sounds] : lungs were clear to auscultation bilaterally [Heart Sounds] : normal S1 and S2 [Abdomen Tenderness] : non-tender [Abnormal Walk] : normal gait [] : no rash [Affect] : the affect was normal [FreeTextEntry1] : pleasant

## 2023-01-14 NOTE — HISTORY OF PRESENT ILLNESS
[FreeTextEntry1] : 88-year-old man\par Routine follow-up\par "I feel good."  Ezra denies exertional chest pain, shortness of breath at rest palpitations ankle edema orthopnea or syncope.  No bleeding complications while taking apixaban.  Occasional feelings of anxiety and chest tightness at rest during times when he is in bed and unable to sleep.\par \par Mr. Pascual has requested a referral for a primary care physician.  I have recommended Dr. SAMUEL Bautista.

## 2023-01-14 NOTE — DISCUSSION/SUMMARY
[FreeTextEntry1] : Chest pain\par The working diagnosis is musculoskeletal/noncardiac chest pain.  The history is consistent with this diagnosis.  The differential diagnosis would include myocardial ischemia/atherosclerotic heart disease.  However the 10/18 coronary arteriogram demonstrated only a 30% mid LAD stenosis.  The remainder of the coronary arteries were normal.  The presence of a left bundle branch block on surface electrocardiogram precludes ECG assessment regarding myocardial ischemia or infarction.\par \par I have recommended the following\par a.  Reassurance\par b.  No further cardiac testing for this problem at this time\par \par \par \par \par Valvular heart disease\par The 7/18 echocardiogram demonstrated severe aortic stenosis with a peak gradient of 84 mmHg mean gradient of 58 mmHg and aortic valve area of 0.7 cm². . 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 Landaverde.    .A   7/22   echocardiogram demonstrated normal functioning of the aortic valve bioprosthesis.  The peak gradient was 9 mmHg mean gradient 6.2 mmHg. DVI 0.52. Mild mitral/aortic/tricuspid regurgitation were noted. The pulmonary artery systolic pressure was  32  mm Hg The left ventricular ejection fraction was 61 %. The present cardiac examination is consistent with normal functioning of the aortic valve bioprosthesis and  the absence of heart failure. There has been significant symptomatic improvement following  aortic  valve replacement.\par \par I have recommended the following:\par 1.No further cardiac testing for this problem at this time\par 2 Antibiotic prophylaxis for appropriate dental and surgical procedures\par \par \par \par \par \par Atrial flutter/fibrillation\par In 7/18 Ezra presented with atrial fibrillation ventricular rate 130/minute. Sinus rhythm was restored spontaneously. In 8/18  he was found to be in atrial flutter with a ventricular rate of 132/minute. . 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 Factor Xa. inhibitor therapy is indicated. The dose of atenolol may be increased if required to control ventricular rates in atrial fibrillation/flutter.\par \par I have recommended the following:\par 1. Continue the present medical regimen\par 2. No further cardiac testing for this problem at this time\par 3. Transtelephonic monitoring of permanent pacemaker\par \par \par \par Hyperlipidemia\par Hyperlipidemia represents a risk factor for atherosclerotic heart disease. Mr. Pascual has minimal coronary disease as reflected by a 30% LAD stenosis on the 10/18 coronary arteriogram. The remainder of the coronary arteries are normal. Medical intervention for hyperlipidemia in this patient's age group is controversial. The target LDL level for primary prevention is about 100. Lipitor was stopped in 2019. In 12/19 the serum cholesterol level was 223 triglycerides 73 HDL 81 and    More recent lipid levels are not available for review.   Nonpharmacological therapy specifically diet and exercise  are emphasized as  major aspects of treatment. \par \par \par I have recommended the following:\par 1 low-salt low-fat low-cholesterol diet. Regular aerobic exercise\par 2 continue the present medical regimen\par 3 target LDL level to about 100 as discussed above\par 4 routine laboratory studies including lipid profile through primary care physician\par \par \par \par \par \par The diagnosis, prognosis, risks, options and alternatives were explained at length to the patient. All questions were answered. Issues discussed included valvular heart disease, atherosclerotic heart disease hyperlipidemia. In addition atrial flutter/fibrillation and treatment with anticoagulation weighing the risks of bleeding versus embolic stroke were reviewed.  \par The importance of antibiotic prophylaxis for appropriate dental and surgical procedures was emphasized.\par \par Counseling and coordination of care:\par Time was a significant factor for this patient encounter. Total time spent with the patient was 30  minutes. 50% of the time was devoted to counseling and coordination of care.\par \par

## 2023-01-14 NOTE — REVIEW OF SYSTEMS
[Feeling Fatigued] : feeling fatigued [Hearing Loss] : hearing loss [Joint Pain] : joint pain [Negative] : Heme/Lymph [FreeTextEntry3] : glasses [FreeTextEntry5] : See history of present illness

## 2023-03-30 ENCOUNTER — APPOINTMENT (OUTPATIENT)
Dept: HEART AND VASCULAR | Facility: CLINIC | Age: 88
End: 2023-03-30
Payer: MEDICARE

## 2023-03-30 ENCOUNTER — NON-APPOINTMENT (OUTPATIENT)
Age: 88
End: 2023-03-30

## 2023-03-30 PROCEDURE — 93294 REM INTERROG EVL PM/LDLS PM: CPT

## 2023-03-30 PROCEDURE — 93296 REM INTERROG EVL PM/IDS: CPT

## 2023-06-02 ENCOUNTER — NON-APPOINTMENT (OUTPATIENT)
Age: 88
End: 2023-06-02

## 2023-06-07 ENCOUNTER — APPOINTMENT (OUTPATIENT)
Dept: INTERNAL MEDICINE | Facility: CLINIC | Age: 88
End: 2023-06-07
Payer: MEDICARE

## 2023-06-07 VITALS
BODY MASS INDEX: 18.04 KG/M2 | DIASTOLIC BLOOD PRESSURE: 84 MMHG | SYSTOLIC BLOOD PRESSURE: 142 MMHG | OXYGEN SATURATION: 96 % | HEIGHT: 68 IN | HEART RATE: 60 BPM | WEIGHT: 119 LBS

## 2023-06-07 DIAGNOSIS — Z87.898 PERSONAL HISTORY OF OTHER SPECIFIED CONDITIONS: ICD-10-CM

## 2023-06-07 PROCEDURE — 99203 OFFICE O/P NEW LOW 30 MIN: CPT | Mod: 25

## 2023-06-07 PROCEDURE — 36415 COLL VENOUS BLD VENIPUNCTURE: CPT

## 2023-06-07 RX ORDER — AMOXICILLIN 500 MG/1
500 CAPSULE ORAL
Qty: 8 | Refills: 3 | Status: DISCONTINUED | COMMUNITY
Start: 2023-06-05 | End: 2023-06-07

## 2023-06-07 RX ORDER — ZOLPIDEM TARTRATE 5 MG/1
TABLET, FILM COATED ORAL
Refills: 0 | Status: DISCONTINUED | COMMUNITY
End: 2023-06-07

## 2023-06-07 NOTE — PHYSICAL EXAM
[No Acute Distress] : no acute distress [Well-Appearing] : well-appearing [No JVD] : no jugular venous distention [Normal Rate] : normal rate  [Regular Rhythm] : with a regular rhythm [Normal S1, S2] : normal S1 and S2 [No Edema] : there was no peripheral edema [Normal] : normal gait, coordination grossly intact, no focal deficits and deep tendon reflexes were 2+ and symmetric [Normal Affect] : the affect was normal [Normal Insight/Judgement] : insight and judgment were intact [de-identified] : + hearing aids in place [de-identified] : PPM, I/VI systolic murmur heard, reg [de-identified] : many moles

## 2023-06-07 NOTE — HEALTH RISK ASSESSMENT
[Good] : ~his/her~ current health as good [Very Good] : ~his/her~  mood as very good [Yes] : Yes [1 or 2 (0 pts)] : 1 or 2 (0 points) [Never (0 pts)] : Never (0 points) [No] : In the past 12 months have you used drugs other than those required for medical reasons? No [No falls in past year] : Patient reported no falls in the past year [0] : 1) Little interest or pleasure doing things: Not at all (0) [1] : 2) Feeling down, depressed, or hopeless for several days (1) [HIV test declined] : HIV test declined [Hepatitis C test declined] : Hepatitis C test declined [Former] : Former [Patient reported colonoscopy was normal] : Patient reported colonoscopy was normal [None] : None [With Significant Other] : lives with significant other [Retired] : retired [] :  [# Of Children ___] : has [unfilled] children [Feels Safe at Home] : Feels safe at home [Fully functional (bathing, dressing, toileting, transferring, walking, feeding)] : Fully functional (bathing, dressing, toileting, transferring, walking, feeding) [Fully functional (using the telephone, shopping, preparing meals, housekeeping, doing laundry, using] : Fully functional and needs no help or supervision to perform IADLs (using the telephone, shopping, preparing meals, housekeeping, doing laundry, using transportation, managing medications and managing finances) [FreeTextEntry1] : none [de-identified] : walking  [de-identified] : regular diet  [[id number]] : 01 [ColonoscopyDate] : 01/22 [ColonoscopyComments] : Dr quintero- many years ago, stool for OB x 3 neg 1/22 [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

## 2023-06-07 NOTE — HISTORY OF PRESENT ILLNESS
[FreeTextEntry1] : establish care\par follow up on conditions [de-identified] : Pt here for new pt visit and to establish care. He feels well overall. walks for exercise and does stretches. No chest pains or SOB. \par Pt follows with Dr Tapia. He has a PPM and a TAVR approx 5 years ago. 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

## 2023-06-08 ENCOUNTER — TRANSCRIPTION ENCOUNTER (OUTPATIENT)
Age: 88
End: 2023-06-08

## 2023-06-08 LAB
ALBUMIN SERPL ELPH-MCNC: 4.2 G/DL
ALP BLD-CCNC: 57 U/L
ALT SERPL-CCNC: 17 U/L
ANION GAP SERPL CALC-SCNC: 9 MMOL/L
AST SERPL-CCNC: 24 U/L
BILIRUB SERPL-MCNC: 1 MG/DL
BUN SERPL-MCNC: 20 MG/DL
CALCIUM SERPL-MCNC: 9.7 MG/DL
CHLORIDE SERPL-SCNC: 106 MMOL/L
CHOLEST SERPL-MCNC: 225 MG/DL
CO2 SERPL-SCNC: 30 MMOL/L
CREAT SERPL-MCNC: 0.95 MG/DL
EGFR: 77 ML/MIN/1.73M2
ESTIMATED AVERAGE GLUCOSE: 117 MG/DL
GLUCOSE SERPL-MCNC: 96 MG/DL
HBA1C MFR BLD HPLC: 5.7 %
HCT VFR BLD CALC: 40 %
HDLC SERPL-MCNC: >120 MG/DL
HGB BLD-MCNC: 12.7 G/DL
LDLC SERPL CALC-MCNC: 94 MG/DL
MCHC RBC-ENTMCNC: 31.8 GM/DL
MCHC RBC-ENTMCNC: 32.4 PG
MCV RBC AUTO: 102 FL
NONHDLC SERPL-MCNC: 105 MG/DL
PLATELET # BLD AUTO: 183 K/UL
POTASSIUM SERPL-SCNC: 5 MMOL/L
PROT SERPL-MCNC: 6.5 G/DL
RBC # BLD: 3.92 M/UL
RBC # FLD: 13.4 %
SODIUM SERPL-SCNC: 145 MMOL/L
TRIGL SERPL-MCNC: 56 MG/DL
TSH SERPL-ACNC: 1.55 UIU/ML
WBC # FLD AUTO: 5.12 K/UL

## 2023-06-29 ENCOUNTER — APPOINTMENT (OUTPATIENT)
Dept: HEART AND VASCULAR | Facility: CLINIC | Age: 88
End: 2023-06-29
Payer: MEDICARE

## 2023-06-29 ENCOUNTER — NON-APPOINTMENT (OUTPATIENT)
Age: 88
End: 2023-06-29

## 2023-06-29 PROCEDURE — 93294 REM INTERROG EVL PM/LDLS PM: CPT

## 2023-06-29 PROCEDURE — 93296 REM INTERROG EVL PM/IDS: CPT

## 2023-07-10 ENCOUNTER — NON-APPOINTMENT (OUTPATIENT)
Age: 88
End: 2023-07-10

## 2023-07-10 ENCOUNTER — APPOINTMENT (OUTPATIENT)
Dept: CARDIOLOGY | Facility: CLINIC | Age: 88
End: 2023-07-10
Payer: MEDICARE

## 2023-07-10 VITALS
BODY MASS INDEX: 17.43 KG/M2 | OXYGEN SATURATION: 98 % | HEIGHT: 68 IN | WEIGHT: 115 LBS | SYSTOLIC BLOOD PRESSURE: 126 MMHG | HEART RATE: 69 BPM | DIASTOLIC BLOOD PRESSURE: 54 MMHG

## 2023-07-10 PROCEDURE — 99213 OFFICE O/P EST LOW 20 MIN: CPT

## 2023-07-10 PROCEDURE — 93000 ELECTROCARDIOGRAM COMPLETE: CPT

## 2023-07-11 NOTE — DISCUSSION/SUMMARY
[FreeTextEntry1] : \par Dizziness\par The working diagnosis is dizziness secondary to fluctuations in blood pressure levels.  The history is consistent with this diagnosis.  The presence of a functioning permanent pacemaker effectively eliminates a bradycardia arrhythmia/heart block as a cause for the patient's symptoms.  In the setting of normal left ventricular systolic function (7/22 echocardiogram left ventricular ejection fraction 61%) the risk for the development of a malignant ventricular arrhythmia is low..\par \par I have recommended the following\par a.  Reassurance\par b.  Maintain adequate hydration\par c.  No further cardiac testing for this problem at this time\par \par \par \par \par Valvular heart disease\par The 7/18 echocardiogram demonstrated severe aortic stenosis with a peak gradient of 84 mmHg mean gradient of 58 mmHg and aortic valve area of 0.7 cm². . 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 Landaverde.    .A   7/22   echocardiogram demonstrated normal functioning of the aortic valve bioprosthesis.  The peak gradient was 9 mmHg mean gradient 6.2 mmHg. DVI 0.52. Mild mitral/aortic/tricuspid regurgitation were noted. The pulmonary artery systolic pressure was  32  mm Hg The left ventricular ejection fraction was 61 %. The present cardiac examination is consistent with normal functioning of the aortic valve bioprosthesis and  the absence of heart failure. There has been significant symptomatic improvement following  aortic  valve replacement.\par \par I have recommended the following:\par 1.No further cardiac testing for this problem at this time\par 2 Antibiotic prophylaxis for appropriate dental and surgical procedures\par 3.  Echocardiogram with next office evaluation in 6 months\par \par \par \par \par Atrial flutter/fibrillation/permanent pacemaker\par In 7/18 Ezra presented with atrial fibrillation ventricular rate 130/minute. Sinus rhythm was restored spontaneously. In 8/18  he was found to be in atrial flutter with a ventricular rate of 132/minute. . 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 Factor Xa. inhibitor therapy is indicated. The dose of atenolol may be increased if required to control ventricular rates in atrial fibrillation/flutter.\par \par I have recommended the following:\par 1. Continue the present medical regimen\par 2. No further cardiac testing for this problem at this time\par 3.  Transtelephonic monitoring of permanent pacemaker through [facility]\par \par \par \par \par Hyperlipidemia\par Hyperlipidemia represents a risk factor for atherosclerotic heart disease. Mr. Pascual has minimal coronary disease as reflected by a 30% LAD stenosis on the 10/18 coronary arteriogram. The remainder of the coronary arteries are normal. Medical intervention for hyperlipidemia in this patient's age group is controversial. The target LDL level for primary prevention is about 100. Lipitor was stopped in 2019. In 6/23 the serum cholesterol level was 225 triglycerides 56 HDL greater than 120 and LDL 94. .   Nonpharmacological therapy specifically diet and exercise  are emphasized as  major aspects of treatment. \par \par \par I have recommended the following:\par 1 low-salt low-fat low-cholesterol diet. Regular aerobic exercise\par 2 continue the present medical regimen\par 3 target LDL level to about 100 as discussed above\par 4 routine laboratory studies including lipid profile through primary care physician Dr. Bautista\par \par \par \par \par \par The diagnosis, prognosis, risks, options and alternatives were explained at length to the patient. All questions were answered. Issues discussed included valvular heart disease, atherosclerotic heart disease hyperlipidemia. In addition atrial flutter/fibrillation and treatment with anticoagulation weighing the risks of bleeding versus embolic stroke were reviewed.  \par The importance of antibiotic prophylaxis for appropriate dental and surgical procedures was emphasized.\par \par Counseling and coordination of care:\par Time was a significant factor for this patient encounter. Total time spent with the patient was 25  minutes. 50% of the time was devoted to counseling and coordination of care.\par \par

## 2023-07-11 NOTE — HISTORY OF PRESENT ILLNESS
[FreeTextEntry1] : 88-year-old man\par Routine follow-up\par \par Occasional positional dizziness.  No chest pain.  No shortness of breath.  No palpitations.  No ankle edema.  No syncope.

## 2023-09-27 ENCOUNTER — APPOINTMENT (OUTPATIENT)
Dept: HEART AND VASCULAR | Facility: CLINIC | Age: 88
End: 2023-09-27
Payer: MEDICARE

## 2023-09-27 ENCOUNTER — NON-APPOINTMENT (OUTPATIENT)
Age: 88
End: 2023-09-27

## 2023-09-27 PROCEDURE — 93294 REM INTERROG EVL PM/LDLS PM: CPT

## 2023-09-27 PROCEDURE — 93296 REM INTERROG EVL PM/IDS: CPT

## 2023-09-29 ENCOUNTER — NON-APPOINTMENT (OUTPATIENT)
Age: 88
End: 2023-09-29

## 2023-10-06 NOTE — H&P ADULT - HEIGHT IN FEET
Chief Complaint   Patient presents with    Establish Cardiologist     Dr Isidra Spencer [facility]EN[facility]       10/6/2023, patient presents for initial medical evaluation. Patient is followed on a regular basis by Dr. Juliocesar Siegel MD. patient presents for evaluation of aortic stenosis. Status post 2D echocardiogram on July 26, 2023 showing ejection fraction of 55 to 60%, mild aortic stenosis with a mean gradient of 12 mmHg, small circumflex with precordial effusion with gelatinous/fibrinous material present. History of hypertension as well as hyperlipidemia. No prior history of cardiac disease    No history of smoking. KG with normal sinus rhythm, poor R wave progression across the precordial leads. Patient Active Problem List   Diagnosis    Actinic keratosis    Allergic arthritis    Arthritis of knee    At risk for falls    Cervical spondylosis without myelopathy    Contracture of joint of hand    Generalized osteoarthritis    Disorder of bone and articular cartilage    Essential hypertension    Gastroesophageal reflux disease    Glaucoma    Hip pain    Hypothyroidism    Pain in joint involving ankle and foot    Knee pain    Low back pain    Lumbosacral radiculitis    Lumbosacral spondylosis without myelopathy    Mixed hyperlipidemia    Moderate major depression (HCC)    Obesity    Open angle with borderline findings and high glaucoma risk in both eyes    Osteoarthritis of hip    Osteoarthritis of knee    Restless legs    Shoulder joint pain    Varicose veins of lower extremity    Vitamin D deficiency    Pulmonary fibrosis (HCC)    TIGIST (obstructive sleep apnea)    Centrilobular emphysema (HCC)    Opioid use, unspecified with unspecified opioid-induced disorder       No past surgical history on file.     Social History     Socioeconomic History    Marital status:    Tobacco Use    Smoking status: Never    Smokeless tobacco: Never   Substance and Sexual Activity 5

## 2023-11-01 ENCOUNTER — RX RENEWAL (OUTPATIENT)
Age: 88
End: 2023-11-01

## 2023-12-03 ENCOUNTER — TRANSCRIPTION ENCOUNTER (OUTPATIENT)
Age: 88
End: 2023-12-03

## 2023-12-05 RX ORDER — ATORVASTATIN CALCIUM 10 MG/1
10 TABLET, FILM COATED ORAL
Qty: 90 | Refills: 3 | Status: ACTIVE | COMMUNITY
Start: 1900-01-01 | End: 1900-01-01

## 2023-12-15 ENCOUNTER — APPOINTMENT (OUTPATIENT)
Dept: INTERNAL MEDICINE | Facility: CLINIC | Age: 88
End: 2023-12-15
Payer: MEDICARE

## 2023-12-15 VITALS
DIASTOLIC BLOOD PRESSURE: 62 MMHG | WEIGHT: 116 LBS | HEART RATE: 68 BPM | BODY MASS INDEX: 17.58 KG/M2 | HEIGHT: 68 IN | SYSTOLIC BLOOD PRESSURE: 114 MMHG | OXYGEN SATURATION: 97 %

## 2023-12-15 DIAGNOSIS — R91.1 SOLITARY PULMONARY NODULE: ICD-10-CM

## 2023-12-15 DIAGNOSIS — G62.9 POLYNEUROPATHY, UNSPECIFIED: ICD-10-CM

## 2023-12-15 PROCEDURE — 36415 COLL VENOUS BLD VENIPUNCTURE: CPT

## 2023-12-15 PROCEDURE — G0439: CPT

## 2023-12-15 PROCEDURE — 99213 OFFICE O/P EST LOW 20 MIN: CPT | Mod: 25

## 2023-12-15 RX ORDER — FLUTICASONE PROPIONATE AND SALMETEROL 250; 50 UG/1; UG/1
250-50 POWDER RESPIRATORY (INHALATION)
Qty: 1 | Refills: 3 | Status: ACTIVE | COMMUNITY
Start: 2023-12-15 | End: 1900-01-01

## 2023-12-15 RX ORDER — BUDESONIDE AND FORMOTEROL FUMARATE DIHYDRATE 160; 4.5 UG/1; UG/1
AEROSOL RESPIRATORY (INHALATION)
Refills: 0 | Status: DISCONTINUED | COMMUNITY
End: 2023-12-15

## 2023-12-15 NOTE — HISTORY OF PRESENT ILLNESS
[FreeTextEntry1] : medicare annual wellness. [de-identified] : Pt has had chronic lower back pain for many years He has numbness in the right foot and he is not able to wiggle the toes as well. No foot drop. No falls. He walks a lot for exercise.  The numbness is not really inhibiting his daily activities.  Pt has had a chornic cough for about 10 years. He has seen ENT and pulmonary over the years. He has been on symbicort, but it will no longer be covered.

## 2023-12-15 NOTE — REVIEW OF SYSTEMS
[Back Pain] : back pain [Negative] : Heme/Lymph [FreeTextEntry9] : mild lower back discomfort, but mostly at night [de-identified] : numbness in RLE at bottom of foot

## 2023-12-15 NOTE — PHYSICAL EXAM
[Normal Sclera/Conjunctiva] : normal sclera/conjunctiva [Normal Outer Ear/Nose] : the outer ears and nose were normal in appearance [No JVD] : no jugular venous distention [Normal Rate] : normal rate  [No Edema] : there was no peripheral edema [Normal] : soft, non-tender, non-distended, no masses palpated, no HSM and normal bowel sounds [Normal Posterior Cervical Nodes] : no posterior cervical lymphadenopathy [Normal Anterior Cervical Nodes] : no anterior cervical lymphadenopathy [No CVA Tenderness] : no CVA  tenderness [No Spinal Tenderness] : no spinal tenderness [No Joint Swelling] : no joint swelling [Grossly Normal Strength/Tone] : grossly normal strength/tone [Coordination Grossly Intact] : coordination grossly intact [No Focal Deficits] : no focal deficits [Normal Gait] : normal gait [Normal Affect] : the affect was normal [Normal Insight/Judgement] : insight and judgment were intact [de-identified] : + cerumen, hearing aids [de-identified] : clear lungs [de-identified] : irreg, soft systolic murmur [de-identified] : dry skin, many moles

## 2023-12-15 NOTE — HEALTH RISK ASSESSMENT
[Excellent] : ~his/her~ current health as excellent [Very Good] : ~his/her~  mood as very good [Yes] : Yes [Monthly or less (1 pt)] : Monthly or less (1 point) [7 to 9 (3 pts)] : 7 to 9 (3  points) [Monthly (2 pts)] : Monthly (2 points) [No] : In the past 12 months have you used drugs other than those required for medical reasons? No [No falls in past year] : Patient reported no falls in the past year [0] : 1) Little interest or pleasure doing things: Not at all (0) [1] : 2) Feeling down, depressed, or hopeless for several days (1) [PHQ-2 Negative - No further assessment needed] : PHQ-2 Negative - No further assessment needed [Patient reported colonoscopy was normal] : Patient reported colonoscopy was normal [HIV test declined] : HIV test declined [Hepatitis C test declined] : Hepatitis C test declined [Change in mental status noted] : Change in mental status noted [Learning/Retaining New Information] : difficulty learning/retaining new information [None] : None [With Significant Other] : lives with significant other [Retired] : retired [] :  [# Of Children ___] : has [unfilled] children [Feels Safe at Home] : Feels safe at home [Fully functional (bathing, dressing, toileting, transferring, walking, feeding)] : Fully functional (bathing, dressing, toileting, transferring, walking, feeding) [Fully functional (using the telephone, shopping, preparing meals, housekeeping, doing laundry, using] : Fully functional and needs no help or supervision to perform IADLs (using the telephone, shopping, preparing meals, housekeeping, doing laundry, using transportation, managing medications and managing finances) [Reports changes in hearing] : Reports changes in hearing [Reports changes in vision] : Reports changes in vision [Reports normal functional visual acuity (ie: able to read med bottle)] : Reports normal functional visual acuity [Smoke Detector] : smoke detector [Carbon Monoxide Detector] : carbon monoxide detector [Guns at Home] : guns at home [Safety elements used in home] : safety elements used in home [Seat Belt] :  uses seat belt [Sunscreen] : uses sunscreen [Former] : Former [FreeTextEntry1] : Pt has pain in right foot [Audit-CScore] : 6 [de-identified] : Pt walking  [de-identified] : Pt considers regular diet  [[id number]] : 1 [Language] : denies difficulty with language [Behavior] : denies difficulty with behavior [Handling Complex Tasks] : denies difficulty handling complex tasks [Reasoning] : denies difficulty with reasoning [Spatial Ability and Orientation] : denies difficulty with spatial ability and orientation [Reports changes in dental health] : Reports no changes in dental health [Travel to Developing Areas] : does not  travel to developing areas [TB Exposure] : is not being exposed to tuberculosis [Caregiver Concerns] : does not have caregiver concerns [ColonoscopyDate] : 01/22 [ColonoscopyComments] : Dr quintero- many years ago, stool for OB x 3 neg 1/22 [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

## 2023-12-16 ENCOUNTER — TRANSCRIPTION ENCOUNTER (OUTPATIENT)
Age: 88
End: 2023-12-16

## 2023-12-16 LAB
ALBUMIN SERPL ELPH-MCNC: 4.4 G/DL
ALP BLD-CCNC: 62 U/L
ALT SERPL-CCNC: 18 U/L
ANION GAP SERPL CALC-SCNC: 12 MMOL/L
AST SERPL-CCNC: 25 U/L
BILIRUB SERPL-MCNC: 1 MG/DL
BUN SERPL-MCNC: 17 MG/DL
CALCIUM SERPL-MCNC: 9.6 MG/DL
CHLORIDE SERPL-SCNC: 104 MMOL/L
CHOLEST SERPL-MCNC: 217 MG/DL
CO2 SERPL-SCNC: 26 MMOL/L
CREAT SERPL-MCNC: 1.06 MG/DL
EGFR: 67 ML/MIN/1.73M2
ESTIMATED AVERAGE GLUCOSE: 117 MG/DL
GLUCOSE SERPL-MCNC: 90 MG/DL
HBA1C MFR BLD HPLC: 5.7 %
HCT VFR BLD CALC: 40.8 %
HDLC SERPL-MCNC: 117 MG/DL
HGB BLD-MCNC: 13.4 G/DL
LDLC SERPL CALC-MCNC: 89 MG/DL
MCHC RBC-ENTMCNC: 32.8 GM/DL
MCHC RBC-ENTMCNC: 33.3 PG
MCV RBC AUTO: 101.5 FL
NONHDLC SERPL-MCNC: 100 MG/DL
PLATELET # BLD AUTO: 208 K/UL
POTASSIUM SERPL-SCNC: 4.7 MMOL/L
PROT SERPL-MCNC: 7 G/DL
RBC # BLD: 4.02 M/UL
RBC # FLD: 13.5 %
SODIUM SERPL-SCNC: 142 MMOL/L
TRIGL SERPL-MCNC: 65 MG/DL
WBC # FLD AUTO: 5.91 K/UL

## 2023-12-22 ENCOUNTER — TRANSCRIPTION ENCOUNTER (OUTPATIENT)
Age: 88
End: 2023-12-22

## 2023-12-27 ENCOUNTER — APPOINTMENT (OUTPATIENT)
Dept: HEART AND VASCULAR | Facility: CLINIC | Age: 88
End: 2023-12-27
Payer: MEDICARE

## 2023-12-27 ENCOUNTER — NON-APPOINTMENT (OUTPATIENT)
Age: 88
End: 2023-12-27

## 2023-12-27 PROCEDURE — 93296 REM INTERROG EVL PM/IDS: CPT

## 2023-12-27 PROCEDURE — 93294 REM INTERROG EVL PM/LDLS PM: CPT

## 2023-12-28 ENCOUNTER — RESULT REVIEW (OUTPATIENT)
Age: 88
End: 2023-12-28

## 2023-12-30 ENCOUNTER — NON-APPOINTMENT (OUTPATIENT)
Age: 88
End: 2023-12-30

## 2024-01-08 ENCOUNTER — APPOINTMENT (OUTPATIENT)
Dept: CARDIOLOGY | Facility: CLINIC | Age: 89
End: 2024-01-08
Payer: MEDICARE

## 2024-01-08 DIAGNOSIS — Z95.2 PRESENCE OF PROSTHETIC HEART VALVE: ICD-10-CM

## 2024-01-08 PROCEDURE — 93306 TTE W/DOPPLER COMPLETE: CPT

## 2024-01-09 PROBLEM — Z95.2 AORTIC VALVE PROSTHESIS PRESENT: Status: ACTIVE | Noted: 2022-01-21

## 2024-01-10 ENCOUNTER — TRANSCRIPTION ENCOUNTER (OUTPATIENT)
Age: 89
End: 2024-01-10

## 2024-01-17 ENCOUNTER — NON-APPOINTMENT (OUTPATIENT)
Age: 89
End: 2024-01-17

## 2024-01-17 ENCOUNTER — APPOINTMENT (OUTPATIENT)
Dept: CARDIOLOGY | Facility: CLINIC | Age: 89
End: 2024-01-17
Payer: MEDICARE

## 2024-01-17 VITALS
OXYGEN SATURATION: 98 % | SYSTOLIC BLOOD PRESSURE: 155 MMHG | WEIGHT: 118 LBS | DIASTOLIC BLOOD PRESSURE: 66 MMHG | HEIGHT: 68 IN | HEART RATE: 60 BPM | BODY MASS INDEX: 17.88 KG/M2

## 2024-01-17 DIAGNOSIS — Z86.79 PERSONAL HISTORY OF OTHER DISEASES OF THE CIRCULATORY SYSTEM: ICD-10-CM

## 2024-01-17 DIAGNOSIS — I51.9 HEART DISEASE, UNSPECIFIED: ICD-10-CM

## 2024-01-17 DIAGNOSIS — I38 ENDOCARDITIS, VALVE UNSPECIFIED: ICD-10-CM

## 2024-01-17 PROCEDURE — 99214 OFFICE O/P EST MOD 30 MIN: CPT

## 2024-01-17 PROCEDURE — 93000 ELECTROCARDIOGRAM COMPLETE: CPT

## 2024-01-19 ENCOUNTER — NON-APPOINTMENT (OUTPATIENT)
Age: 89
End: 2024-01-19

## 2024-01-23 NOTE — REASON FOR VISIT
[FreeTextEntry1] : 89-year-old man Routine follow-up for valvular heart disease status post transcatheter aortic valve replacement atrial flutter permanent pacemaker hyperlipidemia now with left ventricular systolic dysfunction.  Ezra reports episodes of profound fatigue in the afternoon.  However no chest pain.  No shortness of breath.  No ankle edema.  No orthopnea.  No syncope

## 2024-01-23 NOTE — DISCUSSION/SUMMARY
[FreeTextEntry1] :      Valvular heart disease The  echocardiogram demonstrated severe aortic stenosis with a peak gradient of 84 mmHg mean gradient of 58 mmHg and aortic valve area of 0.7 cm. . 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 Landaverde.    .A      echocardiogram demonstrated normal functioning of the aortic valve bioprosthesis.   The left ventricular ejection fraction was 61 %. The  echocardiogram again showed normal functioning of the aortic valve bioprosthesis.  Mild-moderate mitral and mild aortic regurgitation was reported.  The pulmonary systolic pressure was 30 mmHg.  The left ventricular end-diastolic diameter was normal at 3.8 cm however the left ventricular ejection fraction was 38% with global hypokinesis.   The present cardiac examination is consistent with normal functioning of the aortic valve bioprosthesis and  the absence of heart failure.   I have recommended the followin.No further cardiac testing for this problem at this time 2 Antibiotic prophylaxis for appropriate dental and surgical procedures 3.  Echocardiogram with next office evaluation in 6 months     Atrial flutter/fibrillation/permanent pacemaker In  Ezra presented with atrial fibrillation ventricular rate 130/minute. Sinus rhythm was restored spontaneously. In   he was found to be in atrial flutter with a ventricular rate of 132/minute. . 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 An elevated "[id number]" score is indicative of an increased risk of systemic/cerebral emboli. Factor Xa. inhibitor therapy is indicated. The dose of atenolol may be increased if required to control ventricular rates in atrial fibrillation/flutter.  I have recommended the followin. Continue the present medical regimen 2. No further cardiac testing for this problem at this time 3.  Transtelephonic monitoring of permanent pacemaker through [facility]     Hyperlipidemia Hyperlipidemia represents a risk factor for atherosclerotic heart disease. Mr. Pascual has minimal coronary disease as reflected by a 30% LAD stenosis on the 10/18 coronary arteriogram. The remainder of the coronary arteries are normal. Medical intervention for hyperlipidemia in this patient's age group is controversial. The target LDL level for primary prevention is about 100. Lipitor was stopped in 2019. In  the serum cholesterol level was 225 triglycerides 56 HDL greater than 120 and LDL 94. .   Nonpharmacological therapy specifically diet and exercise  are emphasized as  major aspects of treatment.    I have recommended the followin low-salt low-fat low-cholesterol diet. Regular aerobic exercise 2 continue the present medical regimen 3 target LDL level to about 100 as discussed above 4 routine laboratory studies including lipid profile through primary care physician Dr. Bautista      The diagnosis, prognosis, risks, options and alternatives were explained at length to the patient. All questions were answered. Issues discussed included valvular heart disease, atherosclerotic heart disease hyperlipidemia. In addition atrial flutter/fibrillation and treatment with anticoagulation weighing the risks of bleeding versus embolic stroke were reviewed.   The importance of antibiotic prophylaxis for appropriate dental and surgical procedures was emphasized.  Counseling and coordination of care: Time was a significant factor for this patient encounter. Total time spent with the patient was 25  minutes. 50% of the time was devoted to counseling and coordination of care.

## 2024-01-27 PROBLEM — Z86.79 HISTORY OF HEART VALVE ABNORMALITY: Status: RESOLVED | Noted: 2018-08-03 | Resolved: 2024-01-27

## 2024-01-27 PROBLEM — I51.9 LEFT VENTRICULAR SYSTOLIC DYSFUNCTION: Status: ACTIVE | Noted: 2024-01-15

## 2024-01-27 PROBLEM — I38 VALVULAR HEART DISEASE: Status: ACTIVE | Noted: 2018-08-03

## 2024-01-31 ENCOUNTER — APPOINTMENT (OUTPATIENT)
Dept: CARDIOLOGY | Facility: CLINIC | Age: 89
End: 2024-01-31
Payer: MEDICARE

## 2024-01-31 PROCEDURE — 36415 COLL VENOUS BLD VENIPUNCTURE: CPT

## 2024-02-01 ENCOUNTER — NON-APPOINTMENT (OUTPATIENT)
Age: 89
End: 2024-02-01

## 2024-02-01 LAB
ANION GAP SERPL CALC-SCNC: 8 MMOL/L
BUN SERPL-MCNC: 22 MG/DL
CALCIUM SERPL-MCNC: 9.2 MG/DL
CHLORIDE SERPL-SCNC: 104 MMOL/L
CO2 SERPL-SCNC: 28 MMOL/L
CREAT SERPL-MCNC: 1.16 MG/DL
EGFR: 60 ML/MIN/1.73M2
GLUCOSE SERPL-MCNC: 93 MG/DL
HCT VFR BLD CALC: 40.5 %
HGB BLD-MCNC: 13.1 G/DL
MCHC RBC-ENTMCNC: 32.3 GM/DL
MCHC RBC-ENTMCNC: 32.7 PG
MCV RBC AUTO: 101 FL
PLATELET # BLD AUTO: 193 K/UL
POTASSIUM SERPL-SCNC: 4.6 MMOL/L
RBC # BLD: 4.01 M/UL
RBC # FLD: 13.4 %
SODIUM SERPL-SCNC: 140 MMOL/L
WBC # FLD AUTO: 6.15 K/UL

## 2024-02-02 ENCOUNTER — TRANSCRIPTION ENCOUNTER (OUTPATIENT)
Age: 89
End: 2024-02-02

## 2024-03-26 ENCOUNTER — NON-APPOINTMENT (OUTPATIENT)
Age: 89
End: 2024-03-26

## 2024-03-27 ENCOUNTER — APPOINTMENT (OUTPATIENT)
Dept: HEART AND VASCULAR | Facility: CLINIC | Age: 89
End: 2024-03-27
Payer: MEDICARE

## 2024-03-27 PROCEDURE — 93296 REM INTERROG EVL PM/IDS: CPT

## 2024-03-27 PROCEDURE — 93294 REM INTERROG EVL PM/LDLS PM: CPT

## 2024-03-27 RX ORDER — APIXABAN 2.5 MG/1
2.5 TABLET, FILM COATED ORAL
Qty: 180 | Refills: 3 | Status: ACTIVE | COMMUNITY
Start: 2024-03-27 | End: 1900-01-01

## 2024-05-21 RX ORDER — SACUBITRIL AND VALSARTAN 49; 51 MG/1; MG/1
49-51 TABLET, FILM COATED ORAL
Qty: 180 | Refills: 3 | Status: ACTIVE | COMMUNITY
Start: 2024-05-21 | End: 1900-01-01

## 2024-06-14 ENCOUNTER — APPOINTMENT (OUTPATIENT)
Dept: INTERNAL MEDICINE | Facility: CLINIC | Age: 89
End: 2024-06-14
Payer: MEDICARE

## 2024-06-14 VITALS
HEART RATE: 62 BPM | SYSTOLIC BLOOD PRESSURE: 100 MMHG | DIASTOLIC BLOOD PRESSURE: 62 MMHG | OXYGEN SATURATION: 98 % | WEIGHT: 120 LBS | HEIGHT: 68 IN | BODY MASS INDEX: 18.19 KG/M2

## 2024-06-14 DIAGNOSIS — R05.3 CHRONIC COUGH: ICD-10-CM

## 2024-06-14 DIAGNOSIS — G47.9 SLEEP DISORDER, UNSPECIFIED: ICD-10-CM

## 2024-06-14 DIAGNOSIS — R73.03 PREDIABETES.: ICD-10-CM

## 2024-06-14 DIAGNOSIS — I48.92 UNSPECIFIED ATRIAL FLUTTER: ICD-10-CM

## 2024-06-14 DIAGNOSIS — E78.5 HYPERLIPIDEMIA, UNSPECIFIED: ICD-10-CM

## 2024-06-14 DIAGNOSIS — Z95.0 PRESENCE OF CARDIAC PACEMAKER: ICD-10-CM

## 2024-06-14 PROCEDURE — 99213 OFFICE O/P EST LOW 20 MIN: CPT

## 2024-06-14 PROCEDURE — G2211 COMPLEX E/M VISIT ADD ON: CPT

## 2024-06-14 RX ORDER — SACUBITRIL AND VALSARTAN 49; 51 MG/1; MG/1
49-51 TABLET, FILM COATED ORAL TWICE DAILY
Qty: 60 | Refills: 3 | Status: DISCONTINUED | COMMUNITY
Start: 2024-01-17 | End: 2024-06-14

## 2024-06-14 RX ORDER — APIXABAN 5 MG/1
5 TABLET, FILM COATED ORAL
Qty: 180 | Refills: 3 | Status: DISCONTINUED | COMMUNITY
Start: 2022-07-15 | End: 2024-06-14

## 2024-06-14 RX ORDER — ATENOLOL 25 MG/1
25 TABLET ORAL
Qty: 90 | Refills: 3 | Status: DISCONTINUED | COMMUNITY
Start: 2020-07-16 | End: 2024-06-14

## 2024-06-14 RX ORDER — FLUTICASONE FUROATE AND VILANTEROL TRIFENATATE 200; 25 UG/1; UG/1
200-25 POWDER RESPIRATORY (INHALATION)
Qty: 1 | Refills: 5 | Status: DISCONTINUED | COMMUNITY
Start: 2024-02-02 | End: 2024-06-14

## 2024-06-14 RX ORDER — MOMETASONE FUROATE AND FORMOTEROL FUMARATE DIHYDRATE 200; 5 UG/1; UG/1
200-5 AEROSOL RESPIRATORY (INHALATION) TWICE DAILY
Qty: 3 | Refills: 1 | Status: DISCONTINUED | COMMUNITY
Start: 2023-12-22 | End: 2024-06-14

## 2024-06-14 NOTE — PHYSICAL EXAM
[No JVD] : no jugular venous distention [Supple] : supple [Normal] : no respiratory distress, lungs were clear to auscultation bilaterally and no accessory muscle use [Normal Rate] : normal rate  [No Edema] : there was no peripheral edema [Grossly Normal Strength/Tone] : grossly normal strength/tone [Coordination Grossly Intact] : coordination grossly intact [No Focal Deficits] : no focal deficits [Normal Gait] : normal gait [Normal Affect] : the affect was normal [Normal Insight/Judgement] : insight and judgment were intact [de-identified] : hearing aids [de-identified] : irreg, I/VI systolic murmur [de-identified] : many skin lesions

## 2024-06-14 NOTE — REVIEW OF SYSTEMS
[Back Pain] : back pain [Insomnia] : insomnia [Negative] : Heme/Lymph [Suicidal] : not suicidal [Anxiety] : no anxiety [Depression] : no depression

## 2024-06-14 NOTE — HEALTH RISK ASSESSMENT
[Yes] : Yes [4 or more  times a week (4 pts)] : 4 or more  times a week (4 points) [1 or 2 (0 pts)] : 1 or 2 (0 points) [Never (0 pts)] : Never (0 points) [No] : In the past 12 months have you used drugs other than those required for medical reasons? No [No falls in past year] : Patient reported no falls in the past year [0] : 2) Feeling down, depressed, or hopeless: Not at all (0) [Former] : Former [20 or more] : 20 or more [> 15 Years] : > 15 Years [de-identified] : walking  [de-identified] : regular diet  [[id number]] : 0

## 2024-06-14 NOTE — HISTORY OF PRESENT ILLNESS
[FreeTextEntry1] : follow up visit. [de-identified] : Pt here for f/u visit. He still has issues with sleep. He goes to bed at 9 and 9:30 and wakes up at 3:30AM and then has trouble falling back to sleep. He says he gets some back pain and also some itchiness in back. He is very active overall. Walks daily for exercise. No cardiac complaints at present.  He feels like he is slowing down due to aging.

## 2024-06-28 ENCOUNTER — APPOINTMENT (OUTPATIENT)
Dept: HEART AND VASCULAR | Facility: CLINIC | Age: 89
End: 2024-06-28

## 2024-07-01 ENCOUNTER — NON-APPOINTMENT (OUTPATIENT)
Age: 89
End: 2024-07-01

## 2024-07-01 ENCOUNTER — APPOINTMENT (OUTPATIENT)
Dept: HEART AND VASCULAR | Facility: CLINIC | Age: 89
End: 2024-07-01
Payer: MEDICARE

## 2024-07-01 PROCEDURE — 93296 REM INTERROG EVL PM/IDS: CPT

## 2024-07-01 PROCEDURE — 93294 REM INTERROG EVL PM/LDLS PM: CPT

## 2024-07-10 ENCOUNTER — APPOINTMENT (OUTPATIENT)
Dept: CARDIOLOGY | Facility: CLINIC | Age: 89
End: 2024-07-10
Payer: MEDICARE

## 2024-07-10 DIAGNOSIS — Z95.2 PRESENCE OF PROSTHETIC HEART VALVE: ICD-10-CM

## 2024-07-10 PROCEDURE — 93306 TTE W/DOPPLER COMPLETE: CPT

## 2024-07-11 DIAGNOSIS — Z86.79 PERSONAL HISTORY OF OTHER DISEASES OF THE CIRCULATORY SYSTEM: ICD-10-CM

## 2024-07-19 ENCOUNTER — APPOINTMENT (OUTPATIENT)
Dept: CARDIOLOGY | Facility: CLINIC | Age: 89
End: 2024-07-19
Payer: MEDICARE

## 2024-07-19 VITALS
BODY MASS INDEX: 17.58 KG/M2 | SYSTOLIC BLOOD PRESSURE: 107 MMHG | DIASTOLIC BLOOD PRESSURE: 36 MMHG | OXYGEN SATURATION: 98 % | WEIGHT: 116 LBS | HEART RATE: 60 BPM | HEIGHT: 68 IN

## 2024-07-19 DIAGNOSIS — Z95.0 PRESENCE OF CARDIAC PACEMAKER: ICD-10-CM

## 2024-07-19 DIAGNOSIS — I51.9 HEART DISEASE, UNSPECIFIED: ICD-10-CM

## 2024-07-19 DIAGNOSIS — I48.92 UNSPECIFIED ATRIAL FLUTTER: ICD-10-CM

## 2024-07-19 DIAGNOSIS — E78.5 HYPERLIPIDEMIA, UNSPECIFIED: ICD-10-CM

## 2024-07-19 DIAGNOSIS — R42 DIZZINESS AND GIDDINESS: ICD-10-CM

## 2024-07-19 DIAGNOSIS — I38 ENDOCARDITIS, VALVE UNSPECIFIED: ICD-10-CM

## 2024-07-19 PROCEDURE — 99213 OFFICE O/P EST LOW 20 MIN: CPT

## 2024-07-19 RX ORDER — AMOXICILLIN 500 MG/1
500 TABLET, FILM COATED ORAL
Qty: 4 | Refills: 3 | Status: ACTIVE | COMMUNITY
Start: 2024-07-19 | End: 1900-01-01

## 2024-07-19 RX ORDER — SACUBITRIL AND VALSARTAN 24; 26 MG/1; MG/1
24-26 TABLET, FILM COATED ORAL
Qty: 180 | Refills: 3 | Status: ACTIVE | COMMUNITY
Start: 2024-07-19 | End: 1900-01-01

## 2024-07-21 PROBLEM — R42 DIZZINESS: Status: ACTIVE | Noted: 2024-07-21

## 2024-07-21 RX ORDER — MOMETASONE FUROATE AND FORMOTEROL FUMARATE DIHYDRATE 200; 5 UG/1; UG/1
200-5 AEROSOL RESPIRATORY (INHALATION)
Refills: 0 | Status: ACTIVE | COMMUNITY

## 2024-08-09 ENCOUNTER — TRANSCRIPTION ENCOUNTER (OUTPATIENT)
Age: 89
End: 2024-08-09

## 2024-08-12 ENCOUNTER — TRANSCRIPTION ENCOUNTER (OUTPATIENT)
Age: 89
End: 2024-08-12

## 2024-08-14 ENCOUNTER — TRANSCRIPTION ENCOUNTER (OUTPATIENT)
Age: 89
End: 2024-08-14

## 2024-08-29 ENCOUNTER — TRANSCRIPTION ENCOUNTER (OUTPATIENT)
Age: 89
End: 2024-08-29

## 2024-09-25 ENCOUNTER — NON-APPOINTMENT (OUTPATIENT)
Age: 89
End: 2024-09-25

## 2024-09-30 ENCOUNTER — APPOINTMENT (OUTPATIENT)
Dept: HEART AND VASCULAR | Facility: CLINIC | Age: 89
End: 2024-09-30

## 2024-09-30 PROCEDURE — 93296 REM INTERROG EVL PM/IDS: CPT

## 2024-09-30 PROCEDURE — 93294 REM INTERROG EVL PM/LDLS PM: CPT

## 2024-12-19 ENCOUNTER — APPOINTMENT (OUTPATIENT)
Dept: INTERNAL MEDICINE | Facility: CLINIC | Age: 88
End: 2024-12-19
Payer: MEDICARE

## 2024-12-19 VITALS
OXYGEN SATURATION: 98 % | WEIGHT: 115 LBS | DIASTOLIC BLOOD PRESSURE: 60 MMHG | RESPIRATION RATE: 16 BRPM | TEMPERATURE: 97.2 F | SYSTOLIC BLOOD PRESSURE: 122 MMHG | HEART RATE: 62 BPM | BODY MASS INDEX: 17.43 KG/M2 | HEIGHT: 68 IN

## 2024-12-19 DIAGNOSIS — R91.1 SOLITARY PULMONARY NODULE: ICD-10-CM

## 2024-12-19 DIAGNOSIS — G62.9 POLYNEUROPATHY, UNSPECIFIED: ICD-10-CM

## 2024-12-19 DIAGNOSIS — I63.9 CEREBRAL INFARCTION, UNSPECIFIED: ICD-10-CM

## 2024-12-19 DIAGNOSIS — I48.92 UNSPECIFIED ATRIAL FLUTTER: ICD-10-CM

## 2024-12-19 DIAGNOSIS — E78.5 HYPERLIPIDEMIA, UNSPECIFIED: ICD-10-CM

## 2024-12-19 DIAGNOSIS — Z95.0 PRESENCE OF CARDIAC PACEMAKER: ICD-10-CM

## 2024-12-19 DIAGNOSIS — R73.03 PREDIABETES.: ICD-10-CM

## 2024-12-19 DIAGNOSIS — I63.89 OTHER CEREBRAL INFARCTION: ICD-10-CM

## 2024-12-19 DIAGNOSIS — Z95.2 PRESENCE OF PROSTHETIC HEART VALVE: ICD-10-CM

## 2024-12-19 PROCEDURE — 99213 OFFICE O/P EST LOW 20 MIN: CPT | Mod: 25

## 2024-12-19 PROCEDURE — G0439: CPT

## 2024-12-19 PROCEDURE — 36415 COLL VENOUS BLD VENIPUNCTURE: CPT

## 2024-12-19 RX ORDER — ASPIRIN ENTERIC COATED TABLETS 81 MG 81 MG/1
81 TABLET, DELAYED RELEASE ORAL
Qty: 90 | Refills: 1 | Status: ACTIVE | COMMUNITY
Start: 2024-12-19 | End: 1900-01-01

## 2024-12-20 LAB
ALBUMIN SERPL ELPH-MCNC: 4 G/DL
ALP BLD-CCNC: 60 U/L
ALT SERPL-CCNC: 17 U/L
ANION GAP SERPL CALC-SCNC: 9 MMOL/L
AST SERPL-CCNC: 21 U/L
BILIRUB SERPL-MCNC: 0.8 MG/DL
BUN SERPL-MCNC: 26 MG/DL
CALCIUM SERPL-MCNC: 9.4 MG/DL
CHLORIDE SERPL-SCNC: 106 MMOL/L
CHOLEST SERPL-MCNC: 218 MG/DL
CO2 SERPL-SCNC: 27 MMOL/L
CREAT SERPL-MCNC: 1.25 MG/DL
EGFR: 55 ML/MIN/1.73M2
ESTIMATED AVERAGE GLUCOSE: 111 MG/DL
GLUCOSE SERPL-MCNC: 94 MG/DL
HBA1C MFR BLD HPLC: 5.5 %
HCT VFR BLD CALC: 35.5 %
HDLC SERPL-MCNC: 101 MG/DL
HGB BLD-MCNC: 11.6 G/DL
LDLC SERPL CALC-MCNC: 108 MG/DL
MCHC RBC-ENTMCNC: 32.7 G/DL
MCHC RBC-ENTMCNC: 33.3 PG
MCV RBC AUTO: 102 FL
NONHDLC SERPL-MCNC: 117 MG/DL
PLATELET # BLD AUTO: 207 K/UL
POTASSIUM SERPL-SCNC: 5.2 MMOL/L
PROT SERPL-MCNC: 6.3 G/DL
RBC # BLD: 3.48 M/UL
RBC # FLD: 14 %
SODIUM SERPL-SCNC: 142 MMOL/L
TRIGL SERPL-MCNC: 52 MG/DL
TSH SERPL-ACNC: 1.52 UIU/ML
WBC # FLD AUTO: 5.94 K/UL

## 2024-12-23 ENCOUNTER — TRANSCRIPTION ENCOUNTER (OUTPATIENT)
Age: 88
End: 2024-12-23

## 2024-12-30 ENCOUNTER — APPOINTMENT (OUTPATIENT)
Dept: HEART AND VASCULAR | Facility: CLINIC | Age: 88
End: 2024-12-30

## 2024-12-30 PROCEDURE — 93296 REM INTERROG EVL PM/IDS: CPT

## 2024-12-30 PROCEDURE — 93294 REM INTERROG EVL PM/LDLS PM: CPT

## 2025-01-17 ENCOUNTER — APPOINTMENT (OUTPATIENT)
Dept: CARDIOLOGY | Facility: CLINIC | Age: 89
End: 2025-01-17
Payer: MEDICARE

## 2025-01-17 ENCOUNTER — NON-APPOINTMENT (OUTPATIENT)
Age: 89
End: 2025-01-17

## 2025-01-17 VITALS
BODY MASS INDEX: 17.43 KG/M2 | HEART RATE: 78 BPM | OXYGEN SATURATION: 100 % | SYSTOLIC BLOOD PRESSURE: 120 MMHG | DIASTOLIC BLOOD PRESSURE: 81 MMHG | WEIGHT: 115 LBS | HEIGHT: 68 IN

## 2025-01-17 DIAGNOSIS — I38 ENDOCARDITIS, VALVE UNSPECIFIED: ICD-10-CM

## 2025-01-17 DIAGNOSIS — E78.5 HYPERLIPIDEMIA, UNSPECIFIED: ICD-10-CM

## 2025-01-17 DIAGNOSIS — I51.89 OTHER ILL-DEFINED HEART DISEASES: ICD-10-CM

## 2025-01-17 DIAGNOSIS — Z95.0 PRESENCE OF CARDIAC PACEMAKER: ICD-10-CM

## 2025-01-17 DIAGNOSIS — I48.92 UNSPECIFIED ATRIAL FLUTTER: ICD-10-CM

## 2025-01-17 PROCEDURE — 99213 OFFICE O/P EST LOW 20 MIN: CPT

## 2025-01-17 PROCEDURE — 93000 ELECTROCARDIOGRAM COMPLETE: CPT

## 2025-01-18 PROBLEM — I51.89 LEFT VENTRICULAR SYSTOLIC DYSFUNCTION: Status: ACTIVE | Noted: 2024-01-15

## 2025-03-17 ENCOUNTER — RESULT REVIEW (OUTPATIENT)
Age: 89
End: 2025-03-17

## 2025-03-17 ENCOUNTER — APPOINTMENT (OUTPATIENT)
Dept: HEMATOLOGY ONCOLOGY | Facility: CLINIC | Age: 89
End: 2025-03-17
Payer: MEDICARE

## 2025-03-17 VITALS
DIASTOLIC BLOOD PRESSURE: 58 MMHG | TEMPERATURE: 97.6 F | RESPIRATION RATE: 16 BRPM | WEIGHT: 115 LBS | OXYGEN SATURATION: 95 % | BODY MASS INDEX: 17.43 KG/M2 | SYSTOLIC BLOOD PRESSURE: 127 MMHG | HEART RATE: 66 BPM | HEIGHT: 68 IN

## 2025-03-17 DIAGNOSIS — D47.2 MONOCLONAL GAMMOPATHY: ICD-10-CM

## 2025-03-17 DIAGNOSIS — G62.9 POLYNEUROPATHY, UNSPECIFIED: ICD-10-CM

## 2025-03-17 PROCEDURE — 99204 OFFICE O/P NEW MOD 45 MIN: CPT

## 2025-03-17 RX ORDER — ATENOLOL 25 MG/1
25 TABLET ORAL
Refills: 0 | Status: ACTIVE | COMMUNITY

## 2025-03-20 PROBLEM — D47.2 MGUS (MONOCLONAL GAMMOPATHY OF UNKNOWN SIGNIFICANCE): Status: ACTIVE | Noted: 2025-03-20

## 2025-03-31 ENCOUNTER — APPOINTMENT (OUTPATIENT)
Dept: HEMATOLOGY ONCOLOGY | Facility: CLINIC | Age: 89
End: 2025-03-31
Payer: MEDICARE

## 2025-03-31 ENCOUNTER — NON-APPOINTMENT (OUTPATIENT)
Age: 89
End: 2025-03-31

## 2025-03-31 ENCOUNTER — APPOINTMENT (OUTPATIENT)
Dept: HEART AND VASCULAR | Facility: CLINIC | Age: 89
End: 2025-03-31

## 2025-03-31 VITALS
RESPIRATION RATE: 16 BRPM | TEMPERATURE: 97 F | DIASTOLIC BLOOD PRESSURE: 68 MMHG | HEIGHT: 68 IN | BODY MASS INDEX: 18.64 KG/M2 | WEIGHT: 123 LBS | SYSTOLIC BLOOD PRESSURE: 144 MMHG | HEART RATE: 61 BPM | OXYGEN SATURATION: 98 %

## 2025-03-31 DIAGNOSIS — G62.9 POLYNEUROPATHY, UNSPECIFIED: ICD-10-CM

## 2025-03-31 DIAGNOSIS — D47.2 MONOCLONAL GAMMOPATHY: ICD-10-CM

## 2025-03-31 PROCEDURE — 99214 OFFICE O/P EST MOD 30 MIN: CPT

## 2025-03-31 PROCEDURE — 93294 REM INTERROG EVL PM/LDLS PM: CPT

## 2025-03-31 PROCEDURE — 93296 REM INTERROG EVL PM/IDS: CPT

## 2025-05-05 ENCOUNTER — APPOINTMENT (OUTPATIENT)
Dept: HEMATOLOGY ONCOLOGY | Facility: CLINIC | Age: 89
End: 2025-05-05

## 2025-05-05 PROCEDURE — 99214 OFFICE O/P EST MOD 30 MIN: CPT

## 2025-06-18 ENCOUNTER — APPOINTMENT (OUTPATIENT)
Dept: INTERNAL MEDICINE | Facility: CLINIC | Age: 89
End: 2025-06-18
Payer: MEDICARE

## 2025-06-18 VITALS
RESPIRATION RATE: 16 BRPM | HEART RATE: 60 BPM | BODY MASS INDEX: 17.73 KG/M2 | HEIGHT: 68 IN | TEMPERATURE: 98.1 F | OXYGEN SATURATION: 98 % | SYSTOLIC BLOOD PRESSURE: 126 MMHG | WEIGHT: 117 LBS | DIASTOLIC BLOOD PRESSURE: 70 MMHG

## 2025-06-18 PROBLEM — R73.03 PREDIABETES: Status: RESOLVED | Noted: 2023-06-07 | Resolved: 2025-06-18

## 2025-06-18 PROBLEM — D47.2 GAMMOPATHY, MONOCLONAL: Status: ACTIVE | Noted: 2025-03-20

## 2025-06-18 PROCEDURE — G2211 COMPLEX E/M VISIT ADD ON: CPT

## 2025-06-18 PROCEDURE — 99213 OFFICE O/P EST LOW 20 MIN: CPT

## 2025-06-18 PROCEDURE — 36415 COLL VENOUS BLD VENIPUNCTURE: CPT

## 2025-06-18 RX ORDER — HYDROXYZINE HYDROCHLORIDE 25 MG/1
25 TABLET ORAL
Refills: 0 | Status: ACTIVE | COMMUNITY

## 2025-06-19 LAB
CHOLEST SERPL-MCNC: 202 MG/DL
HDLC SERPL-MCNC: 106 MG/DL
LDLC SERPL-MCNC: 87 MG/DL
NONHDLC SERPL-MCNC: 96 MG/DL
TRIGL SERPL-MCNC: 48 MG/DL

## 2025-06-30 ENCOUNTER — NON-APPOINTMENT (OUTPATIENT)
Age: 89
End: 2025-06-30

## 2025-06-30 ENCOUNTER — APPOINTMENT (OUTPATIENT)
Dept: HEART AND VASCULAR | Facility: CLINIC | Age: 89
End: 2025-06-30
Payer: MEDICARE

## 2025-06-30 PROCEDURE — 93294 REM INTERROG EVL PM/LDLS PM: CPT

## 2025-06-30 PROCEDURE — 93296 REM INTERROG EVL PM/IDS: CPT

## 2025-07-08 ENCOUNTER — NON-APPOINTMENT (OUTPATIENT)
Age: 89
End: 2025-07-08

## 2025-07-10 ENCOUNTER — APPOINTMENT (OUTPATIENT)
Dept: CARDIOLOGY | Facility: CLINIC | Age: 89
End: 2025-07-10
Payer: MEDICARE

## 2025-07-10 PROBLEM — Z86.79 HISTORY OF HEART VALVE ABNORMALITY: Status: RESOLVED | Noted: 2018-08-03 | Resolved: 2025-07-10

## 2025-07-10 PROCEDURE — 93306 TTE W/DOPPLER COMPLETE: CPT

## 2025-07-18 ENCOUNTER — APPOINTMENT (OUTPATIENT)
Dept: CARDIOLOGY | Facility: CLINIC | Age: 89
End: 2025-07-18
Payer: MEDICARE

## 2025-07-18 VITALS
HEIGHT: 68 IN | WEIGHT: 117 LBS | OXYGEN SATURATION: 98 % | SYSTOLIC BLOOD PRESSURE: 99 MMHG | BODY MASS INDEX: 17.73 KG/M2 | HEART RATE: 60 BPM | DIASTOLIC BLOOD PRESSURE: 41 MMHG

## 2025-07-18 PROCEDURE — G2211 COMPLEX E/M VISIT ADD ON: CPT

## 2025-07-18 PROCEDURE — 99213 OFFICE O/P EST LOW 20 MIN: CPT

## 2025-07-18 RX ORDER — AMOXICILLIN 500 MG/1
500 TABLET, FILM COATED ORAL
Qty: 8 | Refills: 3 | Status: ACTIVE | COMMUNITY
Start: 2025-07-18

## 2025-07-19 PROBLEM — I48.91 ATRIAL FIBRILLATION: Status: ACTIVE | Noted: 2025-07-19
